# Patient Record
Sex: MALE | Race: WHITE | Employment: FULL TIME | URBAN - METROPOLITAN AREA
[De-identification: names, ages, dates, MRNs, and addresses within clinical notes are randomized per-mention and may not be internally consistent; named-entity substitution may affect disease eponyms.]

---

## 2021-01-18 ENCOUNTER — CLINICAL SUPPORT (OUTPATIENT)
Dept: URGENT CARE | Facility: CLINIC | Age: 61
End: 2021-01-18
Payer: COMMERCIAL

## 2021-01-18 DIAGNOSIS — Z20.822 EXPOSURE TO COVID-19 VIRUS: Primary | ICD-10-CM

## 2021-01-18 LAB
CTP QC/QA: YES
SARS-COV-2 RDRP RESP QL NAA+PROBE: NEGATIVE

## 2021-01-18 PROCEDURE — U0002 COVID-19 LAB TEST NON-CDC: HCPCS | Mod: QW,S$GLB,, | Performed by: NURSE PRACTITIONER

## 2021-01-18 PROCEDURE — U0002: ICD-10-PCS | Mod: QW,S$GLB,, | Performed by: NURSE PRACTITIONER

## 2022-09-08 ENCOUNTER — OFFICE VISIT (OUTPATIENT)
Dept: INTERNAL MEDICINE | Facility: CLINIC | Age: 62
End: 2022-09-08
Payer: COMMERCIAL

## 2022-09-08 VITALS
BODY MASS INDEX: 27.46 KG/M2 | RESPIRATION RATE: 18 BRPM | OXYGEN SATURATION: 98 % | WEIGHT: 170.88 LBS | HEART RATE: 58 BPM | SYSTOLIC BLOOD PRESSURE: 102 MMHG | TEMPERATURE: 98 F | HEIGHT: 66 IN | DIASTOLIC BLOOD PRESSURE: 72 MMHG

## 2022-09-08 DIAGNOSIS — Z95.5 S/P DRUG ELUTING CORONARY STENT PLACEMENT: ICD-10-CM

## 2022-09-08 DIAGNOSIS — Z76.89 ENCOUNTER TO ESTABLISH CARE: Primary | ICD-10-CM

## 2022-09-08 DIAGNOSIS — R05.9 COUGH: ICD-10-CM

## 2022-09-08 DIAGNOSIS — Z12.12 ENCOUNTER FOR COLORECTAL CANCER SCREENING: ICD-10-CM

## 2022-09-08 DIAGNOSIS — I25.2 HISTORY OF ST ELEVATION MYOCARDIAL INFARCTION (STEMI): ICD-10-CM

## 2022-09-08 DIAGNOSIS — Z12.11 ENCOUNTER FOR COLORECTAL CANCER SCREENING: ICD-10-CM

## 2022-09-08 DIAGNOSIS — I50.22 CHRONIC SYSTOLIC CONGESTIVE HEART FAILURE: ICD-10-CM

## 2022-09-08 DIAGNOSIS — E78.5 HYPERLIPIDEMIA, UNSPECIFIED HYPERLIPIDEMIA TYPE: ICD-10-CM

## 2022-09-08 DIAGNOSIS — I25.119 CORONARY ARTERY DISEASE INVOLVING NATIVE CORONARY ARTERY OF NATIVE HEART WITH ANGINA PECTORIS: ICD-10-CM

## 2022-09-08 DIAGNOSIS — Z12.5 ENCOUNTER FOR PROSTATE CANCER SCREENING: ICD-10-CM

## 2022-09-08 PROBLEM — I50.9 CONGESTIVE HEART FAILURE: Status: ACTIVE | Noted: 2022-05-10

## 2022-09-08 PROBLEM — I25.10 CORONARY ARTERY DISEASE INVOLVING NATIVE CORONARY ARTERY OF NATIVE HEART: Status: ACTIVE | Noted: 2022-05-10

## 2022-09-08 PROBLEM — I21.3 STEMI (ST ELEVATION MYOCARDIAL INFARCTION): Status: ACTIVE | Noted: 2022-05-03

## 2022-09-08 PROCEDURE — 99999 PR PBB SHADOW E&M-EST. PATIENT-LVL V: CPT | Mod: PBBFAC,,, | Performed by: NURSE PRACTITIONER

## 2022-09-08 PROCEDURE — 3074F SYST BP LT 130 MM HG: CPT | Mod: CPTII,S$GLB,, | Performed by: NURSE PRACTITIONER

## 2022-09-08 PROCEDURE — 1160F PR REVIEW ALL MEDS BY PRESCRIBER/CLIN PHARMACIST DOCUMENTED: ICD-10-PCS | Mod: CPTII,S$GLB,, | Performed by: NURSE PRACTITIONER

## 2022-09-08 PROCEDURE — 99999 PR PBB SHADOW E&M-EST. PATIENT-LVL V: ICD-10-PCS | Mod: PBBFAC,,, | Performed by: NURSE PRACTITIONER

## 2022-09-08 PROCEDURE — 3074F PR MOST RECENT SYSTOLIC BLOOD PRESSURE < 130 MM HG: ICD-10-PCS | Mod: CPTII,S$GLB,, | Performed by: NURSE PRACTITIONER

## 2022-09-08 PROCEDURE — 3008F BODY MASS INDEX DOCD: CPT | Mod: CPTII,S$GLB,, | Performed by: NURSE PRACTITIONER

## 2022-09-08 PROCEDURE — 3008F PR BODY MASS INDEX (BMI) DOCUMENTED: ICD-10-PCS | Mod: CPTII,S$GLB,, | Performed by: NURSE PRACTITIONER

## 2022-09-08 PROCEDURE — 99203 OFFICE O/P NEW LOW 30 MIN: CPT | Mod: S$GLB,,, | Performed by: NURSE PRACTITIONER

## 2022-09-08 PROCEDURE — 1159F MED LIST DOCD IN RCRD: CPT | Mod: CPTII,S$GLB,, | Performed by: NURSE PRACTITIONER

## 2022-09-08 PROCEDURE — 1160F RVW MEDS BY RX/DR IN RCRD: CPT | Mod: CPTII,S$GLB,, | Performed by: NURSE PRACTITIONER

## 2022-09-08 PROCEDURE — 1159F PR MEDICATION LIST DOCUMENTED IN MEDICAL RECORD: ICD-10-PCS | Mod: CPTII,S$GLB,, | Performed by: NURSE PRACTITIONER

## 2022-09-08 PROCEDURE — 99203 PR OFFICE/OUTPT VISIT, NEW, LEVL III, 30-44 MIN: ICD-10-PCS | Mod: S$GLB,,, | Performed by: NURSE PRACTITIONER

## 2022-09-08 PROCEDURE — 3078F PR MOST RECENT DIASTOLIC BLOOD PRESSURE < 80 MM HG: ICD-10-PCS | Mod: CPTII,S$GLB,, | Performed by: NURSE PRACTITIONER

## 2022-09-08 PROCEDURE — 3078F DIAST BP <80 MM HG: CPT | Mod: CPTII,S$GLB,, | Performed by: NURSE PRACTITIONER

## 2022-09-08 PROCEDURE — 4010F PR ACE/ARB THEARPY RXD/TAKEN: ICD-10-PCS | Mod: CPTII,S$GLB,, | Performed by: NURSE PRACTITIONER

## 2022-09-08 PROCEDURE — 4010F ACE/ARB THERAPY RXD/TAKEN: CPT | Mod: CPTII,S$GLB,, | Performed by: NURSE PRACTITIONER

## 2022-09-08 RX ORDER — LEVOCETIRIZINE DIHYDROCHLORIDE 5 MG/1
5 TABLET, FILM COATED ORAL NIGHTLY
Qty: 30 TABLET | Refills: 11 | Status: SHIPPED | OUTPATIENT
Start: 2022-09-08 | End: 2023-09-08

## 2022-09-08 RX ORDER — DAPAGLIFLOZIN 10 MG/1
10 TABLET, FILM COATED ORAL DAILY
Qty: 90 TABLET | Refills: 3 | Status: SHIPPED | OUTPATIENT
Start: 2022-09-08

## 2022-09-08 RX ORDER — NITROGLYCERIN 0.4 MG/1
0.4 TABLET SUBLINGUAL
COMMUNITY
Start: 2022-05-07 | End: 2023-05-07

## 2022-09-08 RX ORDER — TICAGRELOR 90 MG/1
90 TABLET ORAL 2 TIMES DAILY
COMMUNITY
Start: 2022-08-30 | End: 2022-09-08 | Stop reason: SDUPTHER

## 2022-09-08 RX ORDER — METOPROLOL SUCCINATE 25 MG/1
25 TABLET, EXTENDED RELEASE ORAL
COMMUNITY
Start: 2022-05-07 | End: 2022-09-08 | Stop reason: SDUPTHER

## 2022-09-08 RX ORDER — METOPROLOL SUCCINATE 25 MG/1
25 TABLET, EXTENDED RELEASE ORAL DAILY
Qty: 90 TABLET | Refills: 3 | Status: SHIPPED | OUTPATIENT
Start: 2022-09-08 | End: 2023-06-06 | Stop reason: SDUPTHER

## 2022-09-08 RX ORDER — DAPAGLIFLOZIN 10 MG/1
10 TABLET, FILM COATED ORAL
COMMUNITY
Start: 2022-05-13 | End: 2022-09-08 | Stop reason: SDUPTHER

## 2022-09-08 RX ORDER — SACUBITRIL AND VALSARTAN 24; 26 MG/1; MG/1
1 TABLET, FILM COATED ORAL 2 TIMES DAILY
COMMUNITY
Start: 2022-08-30 | End: 2022-09-08 | Stop reason: SDUPTHER

## 2022-09-08 RX ORDER — OMEPRAZOLE 20 MG/1
TABLET, DELAYED RELEASE ORAL
COMMUNITY
Start: 2022-09-02 | End: 2023-06-05

## 2022-09-08 RX ORDER — LISINOPRIL 2.5 MG/1
2.5 TABLET ORAL DAILY
COMMUNITY
Start: 2022-05-07 | End: 2022-09-08

## 2022-09-08 RX ORDER — TICAGRELOR 90 MG/1
90 TABLET ORAL 2 TIMES DAILY
Qty: 60 TABLET | Refills: 6 | Status: SHIPPED | OUTPATIENT
Start: 2022-09-08

## 2022-09-08 RX ORDER — SACUBITRIL AND VALSARTAN 24; 26 MG/1; MG/1
1 TABLET, FILM COATED ORAL 2 TIMES DAILY
Qty: 60 TABLET | Refills: 6 | Status: SHIPPED | OUTPATIENT
Start: 2022-09-08

## 2022-09-08 RX ORDER — ATORVASTATIN CALCIUM 40 MG/1
40 TABLET, FILM COATED ORAL NIGHTLY
COMMUNITY
Start: 2022-08-02 | End: 2022-09-08 | Stop reason: SDUPTHER

## 2022-09-08 RX ORDER — NAPROXEN SODIUM 220 MG/1
81 TABLET, FILM COATED ORAL DAILY
COMMUNITY
Start: 2022-08-02

## 2022-09-08 RX ORDER — ATORVASTATIN CALCIUM 40 MG/1
40 TABLET, FILM COATED ORAL NIGHTLY
Qty: 90 TABLET | Refills: 3 | Status: SHIPPED | OUTPATIENT
Start: 2022-09-08 | End: 2023-11-20 | Stop reason: SDUPTHER

## 2022-09-08 NOTE — PROGRESS NOTES
Subjective:       Patient ID: Gordon Dewitt is a 62 y.o. male.    Chief Complaint: Annual Exam (Physical; labs done)    Gordon Dewitt is a 62 y.o. male who presents today to establish care.     Patient and his wife note a cough that initially was occurring only with eating but in the recent months has become more frequent. He cannot correlate it to particular foods or certain times of day. Notes it is worse if he is sitting in the car with the air blowing in his face.   Denies difficulty swallowing. Currently taking Prilosec to rule out GERD as cause.     Past Medical History:   Diagnosis Date    CAD (coronary artery disease)     CHF (congestive heart failure), NYHA class I     Hyperlipidemia      History reviewed. No pertinent surgical history.  Social History     Socioeconomic History    Marital status:    Tobacco Use    Smoking status: Never    Smokeless tobacco: Never   Substance and Sexual Activity    Alcohol use: Never    Drug use: Never    Sexual activity: Yes     Partners: Female     Birth control/protection: None     Comment: Partner post menopausal     Family History   Problem Relation Age of Onset    Diabetes Father     Diabetes Brother     Heart disease Brother        Review of patient's allergies indicates:  No Known Allergies    Medication List with Changes/Refills   New Medications    LEVOCETIRIZINE (XYZAL) 5 MG TABLET    Take 1 tablet (5 mg total) by mouth every evening.   Current Medications    ASPIRIN 81 MG CHEW    Take 81 mg by mouth once daily.    NITROGLYCERIN (NITROSTAT) 0.4 MG SL TABLET    Place 0.4 mg under the tongue.    OMEPRAZOLE (PRILOSEC OTC) 20 MG TABLET       Changed and/or Refilled Medications    Modified Medication Previous Medication    ATORVASTATIN (LIPITOR) 40 MG TABLET atorvastatin (LIPITOR) 40 MG tablet       Take 1 tablet (40 mg total) by mouth every evening.    Take 40 mg by mouth every evening.    BRILINTA 90 MG TABLET BRILINTA 90 mg tablet       Take 1 tablet (90  "mg total) by mouth 2 (two) times daily.    Take 90 mg by mouth 2 (two) times daily.    DAPAGLIFLOZIN (FARXIGA) 10 MG TABLET dapagliflozin (FARXIGA) 10 mg tablet       Take 1 tablet (10 mg total) by mouth once daily at 6am.    Take 10 mg by mouth.    ENTRESTO 24-26 MG PER TABLET ENTRESTO 24-26 mg per tablet       Take 1 tablet by mouth 2 (two) times daily.    Take 1 tablet by mouth 2 (two) times daily.    METOPROLOL SUCCINATE (TOPROL-XL) 25 MG 24 HR TABLET metoprolol succinate (TOPROL-XL) 25 MG 24 hr tablet       Take 1 tablet (25 mg total) by mouth once daily.    Take 25 mg by mouth.   Discontinued Medications    LISINOPRIL (PRINIVIL,ZESTRIL) 2.5 MG TABLET    Take 2.5 mg by mouth once daily.       Review of Systems   Constitutional:  Negative for chills and fever.   Respiratory:  Positive for cough. Negative for shortness of breath.    Cardiovascular:  Negative for chest pain.   Gastrointestinal:  Negative for heartburn.   Neurological:  Negative for dizziness and headaches.     Objective:   /72 (BP Location: Right arm, Patient Position: Sitting, BP Method: Medium (Manual))   Pulse (!) 58   Temp 98.2 °F (36.8 °C) (Temporal)   Resp 18   Ht 5' 6" (1.676 m)   Wt 77.5 kg (170 lb 13.7 oz)   SpO2 98%   BMI 27.58 kg/m²     Physical Exam  Vitals reviewed.   Constitutional:       Appearance: Normal appearance.   HENT:      Head: Normocephalic and atraumatic.   Cardiovascular:      Rate and Rhythm: Normal rate and regular rhythm.      Heart sounds: Normal heart sounds. No murmur heard.  Pulmonary:      Effort: Pulmonary effort is normal.      Breath sounds: Normal breath sounds. No wheezing.   Skin:     General: Skin is warm and dry.   Neurological:      Mental Status: He is alert and oriented to person, place, and time.     Assessment and Plan:       1. Encounter to establish care    2. Cough    - levocetirizine (XYZAL) 5 MG tablet; Take 1 tablet (5 mg total) by mouth every evening.  Dispense: 30 tablet; Refill: " 11    3. Chronic systolic congestive heart failure  4. Coronary artery disease involving native coronary artery of native heart with angina pectoris    - ENTRESTO 24-26 mg per tablet; Take 1 tablet by mouth 2 (two) times daily.  Dispense: 60 tablet; Refill: 6  - dapagliflozin (FARXIGA) 10 mg tablet; Take 1 tablet (10 mg total) by mouth once daily at 6am.  Dispense: 90 tablet; Refill: 3  - metoprolol succinate (TOPROL-XL) 25 MG 24 hr tablet; Take 1 tablet (25 mg total) by mouth once daily.  Dispense: 90 tablet; Refill: 3  5. Hyperlipidemia, unspecified hyperlipidemia type    - atorvastatin (LIPITOR) 40 MG tablet; Take 1 tablet (40 mg total) by mouth every evening.  Dispense: 90 tablet; Refill: 3  - Lipid Panel; Future    6. S/P drug eluting coronary stent placement  7. History of ST elevation myocardial infarction (STEMI)    - BRILINTA 90 mg tablet; Take 1 tablet (90 mg total) by mouth 2 (two) times daily.  Dispense: 60 tablet; Refill: 6  - CBC Auto Differential; Future  - Comprehensive Metabolic Panel; Future  - TSH; Future  - PSA, Screening; Future  - Hemoglobin A1C; Future    8. Encounter for prostate cancer screening    - PSA, Screening; Future    9. Encounter for colorectal cancer screening    - Ambulatory referral/consult to Endo Procedure ; Future

## 2022-09-27 ENCOUNTER — PATIENT MESSAGE (OUTPATIENT)
Dept: INTERNAL MEDICINE | Facility: CLINIC | Age: 62
End: 2022-09-27
Payer: COMMERCIAL

## 2023-01-18 ENCOUNTER — LAB VISIT (OUTPATIENT)
Dept: LAB | Facility: HOSPITAL | Age: 63
End: 2023-01-18
Payer: COMMERCIAL

## 2023-01-18 DIAGNOSIS — Z12.5 ENCOUNTER FOR PROSTATE CANCER SCREENING: ICD-10-CM

## 2023-01-18 DIAGNOSIS — E78.5 HYPERLIPIDEMIA, UNSPECIFIED HYPERLIPIDEMIA TYPE: ICD-10-CM

## 2023-01-18 DIAGNOSIS — I50.22 CHRONIC SYSTOLIC CONGESTIVE HEART FAILURE: ICD-10-CM

## 2023-01-18 DIAGNOSIS — I25.2 HISTORY OF ST ELEVATION MYOCARDIAL INFARCTION (STEMI): ICD-10-CM

## 2023-01-18 DIAGNOSIS — I25.119 CORONARY ARTERY DISEASE INVOLVING NATIVE CORONARY ARTERY OF NATIVE HEART WITH ANGINA PECTORIS: ICD-10-CM

## 2023-01-18 LAB
ALBUMIN SERPL BCP-MCNC: 4.2 G/DL (ref 3.5–5.2)
ALP SERPL-CCNC: 60 U/L (ref 55–135)
ALT SERPL W/O P-5'-P-CCNC: 20 U/L (ref 10–44)
ANION GAP SERPL CALC-SCNC: 8 MMOL/L (ref 8–16)
AST SERPL-CCNC: 21 U/L (ref 10–40)
BASOPHILS # BLD AUTO: 0.07 K/UL (ref 0–0.2)
BASOPHILS NFR BLD: 1.2 % (ref 0–1.9)
BILIRUB SERPL-MCNC: 0.5 MG/DL (ref 0.1–1)
BUN SERPL-MCNC: 10 MG/DL (ref 8–23)
CALCIUM SERPL-MCNC: 9.3 MG/DL (ref 8.7–10.5)
CHLORIDE SERPL-SCNC: 108 MMOL/L (ref 95–110)
CHOLEST SERPL-MCNC: 115 MG/DL (ref 120–199)
CHOLEST/HDLC SERPL: 3.1 {RATIO} (ref 2–5)
CO2 SERPL-SCNC: 23 MMOL/L (ref 23–29)
COMPLEXED PSA SERPL-MCNC: 1 NG/ML (ref 0–4)
CREAT SERPL-MCNC: 1 MG/DL (ref 0.5–1.4)
DIFFERENTIAL METHOD: ABNORMAL
EOSINOPHIL # BLD AUTO: 0.3 K/UL (ref 0–0.5)
EOSINOPHIL NFR BLD: 4.3 % (ref 0–8)
ERYTHROCYTE [DISTWIDTH] IN BLOOD BY AUTOMATED COUNT: 15 % (ref 11.5–14.5)
EST. GFR  (NO RACE VARIABLE): >60 ML/MIN/1.73 M^2
ESTIMATED AVG GLUCOSE: 128 MG/DL (ref 68–131)
GLUCOSE SERPL-MCNC: 99 MG/DL (ref 70–110)
HBA1C MFR BLD: 6.1 % (ref 4–5.6)
HCT VFR BLD AUTO: 46.6 % (ref 40–54)
HDLC SERPL-MCNC: 37 MG/DL (ref 40–75)
HDLC SERPL: 32.2 % (ref 20–50)
HGB BLD-MCNC: 14.1 G/DL (ref 14–18)
IMM GRANULOCYTES # BLD AUTO: 0.02 K/UL (ref 0–0.04)
IMM GRANULOCYTES NFR BLD AUTO: 0.3 % (ref 0–0.5)
LDLC SERPL CALC-MCNC: 62 MG/DL (ref 63–159)
LYMPHOCYTES # BLD AUTO: 2.5 K/UL (ref 1–4.8)
LYMPHOCYTES NFR BLD: 41.6 % (ref 18–48)
MCH RBC QN AUTO: 26.1 PG (ref 27–31)
MCHC RBC AUTO-ENTMCNC: 30.3 G/DL (ref 32–36)
MCV RBC AUTO: 86 FL (ref 82–98)
MONOCYTES # BLD AUTO: 0.5 K/UL (ref 0.3–1)
MONOCYTES NFR BLD: 8.8 % (ref 4–15)
NEUTROPHILS # BLD AUTO: 2.6 K/UL (ref 1.8–7.7)
NEUTROPHILS NFR BLD: 43.8 % (ref 38–73)
NONHDLC SERPL-MCNC: 78 MG/DL
NRBC BLD-RTO: 0 /100 WBC
PLATELET # BLD AUTO: 252 K/UL (ref 150–450)
PMV BLD AUTO: 11.6 FL (ref 9.2–12.9)
POTASSIUM SERPL-SCNC: 4.2 MMOL/L (ref 3.5–5.1)
PROT SERPL-MCNC: 7.3 G/DL (ref 6–8.4)
RBC # BLD AUTO: 5.41 M/UL (ref 4.6–6.2)
SODIUM SERPL-SCNC: 139 MMOL/L (ref 136–145)
TRIGL SERPL-MCNC: 80 MG/DL (ref 30–150)
TSH SERPL DL<=0.005 MIU/L-ACNC: 1.76 UIU/ML (ref 0.4–4)
WBC # BLD AUTO: 6.03 K/UL (ref 3.9–12.7)

## 2023-01-18 PROCEDURE — 84153 ASSAY OF PSA TOTAL: CPT | Performed by: NURSE PRACTITIONER

## 2023-01-18 PROCEDURE — 80053 COMPREHEN METABOLIC PANEL: CPT | Performed by: NURSE PRACTITIONER

## 2023-01-18 PROCEDURE — 36415 COLL VENOUS BLD VENIPUNCTURE: CPT | Mod: PO | Performed by: NURSE PRACTITIONER

## 2023-01-18 PROCEDURE — 83036 HEMOGLOBIN GLYCOSYLATED A1C: CPT | Performed by: NURSE PRACTITIONER

## 2023-01-18 PROCEDURE — 85025 COMPLETE CBC W/AUTO DIFF WBC: CPT | Performed by: NURSE PRACTITIONER

## 2023-01-18 PROCEDURE — 84443 ASSAY THYROID STIM HORMONE: CPT | Performed by: NURSE PRACTITIONER

## 2023-01-18 PROCEDURE — 80061 LIPID PANEL: CPT | Performed by: NURSE PRACTITIONER

## 2023-01-31 ENCOUNTER — PATIENT MESSAGE (OUTPATIENT)
Dept: INTERNAL MEDICINE | Facility: CLINIC | Age: 63
End: 2023-01-31
Payer: COMMERCIAL

## 2023-01-31 DIAGNOSIS — R05.3 CHRONIC COUGH: Primary | ICD-10-CM

## 2023-02-07 ENCOUNTER — HOSPITAL ENCOUNTER (OUTPATIENT)
Dept: PULMONOLOGY | Facility: CLINIC | Age: 63
Discharge: HOME | End: 2023-02-07
Payer: COMMERCIAL

## 2023-02-07 DIAGNOSIS — R05.3 CHRONIC COUGH: ICD-10-CM

## 2023-02-07 PROCEDURE — 94729 PR C02/MEMBANE DIFFUSE CAPACITY: ICD-10-PCS | Mod: S$GLB,,, | Performed by: INTERNAL MEDICINE

## 2023-02-07 PROCEDURE — 94060 PR EVAL OF BRONCHOSPASM: ICD-10-PCS | Mod: S$GLB,,, | Performed by: INTERNAL MEDICINE

## 2023-02-07 PROCEDURE — 94727 GAS DIL/WSHOT DETER LNG VOL: CPT | Mod: S$GLB,,, | Performed by: INTERNAL MEDICINE

## 2023-02-07 PROCEDURE — 94729 DIFFUSING CAPACITY: CPT | Mod: S$GLB,,, | Performed by: INTERNAL MEDICINE

## 2023-02-07 PROCEDURE — 94060 EVALUATION OF WHEEZING: CPT | Mod: S$GLB,,, | Performed by: INTERNAL MEDICINE

## 2023-02-07 PROCEDURE — 94727 PR PULM FUNCTION TEST BY GAS: ICD-10-PCS | Mod: S$GLB,,, | Performed by: INTERNAL MEDICINE

## 2023-02-14 ENCOUNTER — PATIENT MESSAGE (OUTPATIENT)
Dept: INTERNAL MEDICINE | Facility: CLINIC | Age: 63
End: 2023-02-14
Payer: COMMERCIAL

## 2023-02-14 DIAGNOSIS — R05.3 CHRONIC COUGH: Primary | ICD-10-CM

## 2023-02-14 NOTE — TELEPHONE ENCOUNTER
Pt states the pulmonary function test was normal , pt was prescribed a albuterol inhaler as it was recommended by the cardiologist. The cough is somewhat better. Pt is asking do you think he should have a chest CT ? The cough started 9 months ago.

## 2023-02-18 ENCOUNTER — PATIENT MESSAGE (OUTPATIENT)
Dept: INTERNAL MEDICINE | Facility: CLINIC | Age: 63
End: 2023-02-18
Payer: COMMERCIAL

## 2023-03-29 ENCOUNTER — PATIENT MESSAGE (OUTPATIENT)
Dept: INTERNAL MEDICINE | Facility: CLINIC | Age: 63
End: 2023-03-29
Payer: COMMERCIAL

## 2023-03-29 NOTE — TELEPHONE ENCOUNTER
Left message to call back.   See [employer] message for symptom updates--past 3 weeks cough has been worst, vomiting and SOB--pt went to  emergency for evaluation.     Pt given Pulmonology dept #[phone] for scheduling.

## 2023-03-31 ENCOUNTER — OFFICE VISIT (OUTPATIENT)
Dept: PULMONOLOGY | Facility: CLINIC | Age: 63
End: 2023-03-31
Payer: COMMERCIAL

## 2023-03-31 VITALS
SYSTOLIC BLOOD PRESSURE: 110 MMHG | BODY MASS INDEX: 25.79 KG/M2 | HEIGHT: 66 IN | WEIGHT: 160.5 LBS | DIASTOLIC BLOOD PRESSURE: 70 MMHG | HEART RATE: 61 BPM | OXYGEN SATURATION: 97 %

## 2023-03-31 DIAGNOSIS — R05.3 CHRONIC COUGH: ICD-10-CM

## 2023-03-31 DIAGNOSIS — J30.2 SEASONAL ALLERGIC RHINITIS, UNSPECIFIED TRIGGER: Primary | ICD-10-CM

## 2023-03-31 PROCEDURE — 3044F PR MOST RECENT HEMOGLOBIN A1C LEVEL <7.0%: ICD-10-PCS | Mod: CPTII,S$GLB,, | Performed by: INTERNAL MEDICINE

## 2023-03-31 PROCEDURE — 3008F BODY MASS INDEX DOCD: CPT | Mod: CPTII,S$GLB,, | Performed by: INTERNAL MEDICINE

## 2023-03-31 PROCEDURE — 3044F HG A1C LEVEL LT 7.0%: CPT | Mod: CPTII,S$GLB,, | Performed by: INTERNAL MEDICINE

## 2023-03-31 PROCEDURE — 99204 PR OFFICE/OUTPT VISIT, NEW, LEVL IV, 45-59 MIN: ICD-10-PCS | Mod: S$GLB,,, | Performed by: INTERNAL MEDICINE

## 2023-03-31 PROCEDURE — 3008F PR BODY MASS INDEX (BMI) DOCUMENTED: ICD-10-PCS | Mod: CPTII,S$GLB,, | Performed by: INTERNAL MEDICINE

## 2023-03-31 PROCEDURE — 99999 PR PBB SHADOW E&M-EST. PATIENT-LVL IV: ICD-10-PCS | Mod: PBBFAC,,, | Performed by: INTERNAL MEDICINE

## 2023-03-31 PROCEDURE — 3074F PR MOST RECENT SYSTOLIC BLOOD PRESSURE < 130 MM HG: ICD-10-PCS | Mod: CPTII,S$GLB,, | Performed by: INTERNAL MEDICINE

## 2023-03-31 PROCEDURE — 99204 OFFICE O/P NEW MOD 45 MIN: CPT | Mod: S$GLB,,, | Performed by: INTERNAL MEDICINE

## 2023-03-31 PROCEDURE — 3078F PR MOST RECENT DIASTOLIC BLOOD PRESSURE < 80 MM HG: ICD-10-PCS | Mod: CPTII,S$GLB,, | Performed by: INTERNAL MEDICINE

## 2023-03-31 PROCEDURE — 1159F PR MEDICATION LIST DOCUMENTED IN MEDICAL RECORD: ICD-10-PCS | Mod: CPTII,S$GLB,, | Performed by: INTERNAL MEDICINE

## 2023-03-31 PROCEDURE — 1160F RVW MEDS BY RX/DR IN RCRD: CPT | Mod: CPTII,S$GLB,, | Performed by: INTERNAL MEDICINE

## 2023-03-31 PROCEDURE — 1159F MED LIST DOCD IN RCRD: CPT | Mod: CPTII,S$GLB,, | Performed by: INTERNAL MEDICINE

## 2023-03-31 PROCEDURE — 1160F PR REVIEW ALL MEDS BY PRESCRIBER/CLIN PHARMACIST DOCUMENTED: ICD-10-PCS | Mod: CPTII,S$GLB,, | Performed by: INTERNAL MEDICINE

## 2023-03-31 PROCEDURE — 4010F ACE/ARB THERAPY RXD/TAKEN: CPT | Mod: CPTII,S$GLB,, | Performed by: INTERNAL MEDICINE

## 2023-03-31 PROCEDURE — 3074F SYST BP LT 130 MM HG: CPT | Mod: CPTII,S$GLB,, | Performed by: INTERNAL MEDICINE

## 2023-03-31 PROCEDURE — 4010F PR ACE/ARB THEARPY RXD/TAKEN: ICD-10-PCS | Mod: CPTII,S$GLB,, | Performed by: INTERNAL MEDICINE

## 2023-03-31 PROCEDURE — 99999 PR PBB SHADOW E&M-EST. PATIENT-LVL IV: CPT | Mod: PBBFAC,,, | Performed by: INTERNAL MEDICINE

## 2023-03-31 PROCEDURE — 3078F DIAST BP <80 MM HG: CPT | Mod: CPTII,S$GLB,, | Performed by: INTERNAL MEDICINE

## 2023-03-31 RX ORDER — MONTELUKAST SODIUM 10 MG/1
10 TABLET ORAL DAILY
COMMUNITY

## 2023-03-31 RX ORDER — FLUTICASONE PROPIONATE AND SALMETEROL 250; 50 UG/1; UG/1
1 POWDER RESPIRATORY (INHALATION) 2 TIMES DAILY
Qty: 1 EACH | Refills: 11 | Status: SHIPPED | OUTPATIENT
Start: 2023-03-31 | End: 2024-03-30

## 2023-03-31 RX ORDER — CLOTRIMAZOLE AND BETAMETHASONE DIPROPIONATE 10; .64 MG/G; MG/G
CREAM TOPICAL
COMMUNITY
Start: 2023-01-27 | End: 2023-06-05

## 2023-03-31 RX ORDER — ALBUTEROL SULFATE 90 UG/1
2 AEROSOL, METERED RESPIRATORY (INHALATION) EVERY 4 HOURS PRN
COMMUNITY
Start: 2023-03-23

## 2023-03-31 RX ORDER — BUDESONIDE 180 UG/1
2 AEROSOL, POWDER RESPIRATORY (INHALATION) 2 TIMES DAILY
COMMUNITY
Start: 2023-02-03 | End: 2023-03-31

## 2023-03-31 RX ORDER — BENZONATATE 100 MG/1
100 CAPSULE ORAL
COMMUNITY
Start: 2023-03-23 | End: 2023-06-05

## 2023-03-31 RX ORDER — PREDNISONE 20 MG/1
20 TABLET ORAL 2 TIMES DAILY
COMMUNITY
Start: 2023-03-23 | End: 2023-06-06 | Stop reason: SDUPTHER

## 2023-03-31 RX ORDER — INHALER, ASSIST DEVICES
SPACER (EA) MISCELLANEOUS
COMMUNITY
Start: 2023-02-02 | End: 2023-03-31

## 2023-03-31 RX ORDER — FLUTICASONE PROPIONATE 50 MCG
1 SPRAY, SUSPENSION (ML) NASAL DAILY
Qty: 16 G | Refills: 2 | Status: SHIPPED | OUTPATIENT
Start: 2023-03-31 | End: 2023-06-06 | Stop reason: SDUPTHER

## 2023-03-31 RX ORDER — AZELASTINE 1 MG/ML
1 SPRAY, METERED NASAL 2 TIMES DAILY
Qty: 120 ML | Refills: 0 | Status: SHIPPED | OUTPATIENT
Start: 2023-03-31 | End: 2023-06-06 | Stop reason: SDUPTHER

## 2023-03-31 NOTE — PROGRESS NOTES
Subjective:       Patient ID: Gordon Dewitt is a 63 y.o. male.    Chief Complaint: Cough    HPI:   Gordon Dewitt is a 63 y.o. male who presents with chronic cough.    Reports cough for months.   Cough, initially dry but now with mucous.   ED last week and was treated with doxycyline and steroids.   He has not noticed much improvement after treatment.    Prescribed inhalers, but doesn't take them.   The dry powdered inhaler made him feel like his throat was closing.    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        Review of Systems   HENT:  Negative for postnasal drip and congestion.    Respiratory:  Positive for cough, sputum production, wheezing and asthma nighttime symptoms.    Cardiovascular:         Sometimes has issues swallowing.   Skin:  Negative for rash.       Social History     Tobacco Use    Smoking status: Never    Smokeless tobacco: Never   Substance Use Topics    Alcohol use: Never       Review of patient's allergies indicates:  No Known Allergies  Past Medical History:   Diagnosis Date    CAD (coronary artery disease)     CHF (congestive heart failure), NYHA class I     Hyperlipidemia      No past surgical history on file.  Current Outpatient Medications on File Prior to Visit   Medication Sig    aspirin 81 MG Chew Take 81 mg by mouth once daily.    atorvastatin (LIPITOR) 40 MG tablet Take 1 tablet (40 mg total) by mouth every evening.    BRILINTA 90 mg tablet Take 1 tablet (90 mg total) by mouth 2 (two) times daily.    dapagliflozin (FARXIGA) 10 mg tablet Take 1 tablet (10 mg total) by mouth once daily at 6am.    ENTRESTO 24-26 mg per tablet Take 1 tablet by mouth 2 (two) times daily.    levocetirizine (XYZAL) 5 MG tablet Take 1 tablet (5 mg total) by mouth every evening.    metoprolol succinate (TOPROL-XL) 25 MG 24 hr tablet Take 1 tablet (25 mg total) by mouth once daily.    nitroGLYCERIN (NITROSTAT) 0.4 MG SL tablet Place 0.4 mg  "under the tongue.    omeprazole (PRILOSEC OTC) 20 MG tablet      No current facility-administered medications on file prior to visit.       Objective:      Vitals:    03/31/23 0915   BP: 110/70   BP Location: Left arm   Patient Position: Sitting   Pulse: 61   SpO2: 97%   Weight: 72.8 kg (160 lb 7.9 oz)   Height: 5' 6" (1.676 m)     Physical Exam   Constitutional: He is oriented to person, place, and time.   HENT:   Mouth/Throat: Mallampati Score: I.   Cardiovascular: Normal rate and regular rhythm.   Pulmonary/Chest: Normal expansion. He has no wheezes. He has no rhonchi. He has no rales.   Neurological: He is alert and oriented to person, place, and time.   Skin: Skin is warm and dry.   Personal Diagnostic Review    No flowsheet data found.      No image results found.    PFTs: 2/7/23: no obstruction, no restriction, normal DLCO  CXR: EJ: 3/2023: NAPD  EOX 1/2023: 300    Assessment:     Orders Placed This Encounter   Procedures    CBC auto differential     Standing Status:   Future     Standing Expiration Date:   5/29/2024    IGE     Standing Status:   Future     Standing Expiration Date:   5/29/2024     1. Chronic cough        Plan:         Problem List Items Addressed This Visit          ENT    Seasonal allergic rhinitis - Primary    Current Assessment & Plan     Start flonase daily and astelin bid.              Pulmonary    Chronic cough    Current Assessment & Plan     Etiology unclear.   Chest xray is unremarkable.  PFTs show no obstruction and no BDR.   Perhaps NAEB.    Start advair bid. Patient instructed to rinse/gargle after each use.  Continue omeprazole.   Will also treat UACS in case this is multi-factorial   Check CBC, IgE         Relevant Orders    CBC auto differential    IGE        Critical Care Time: 40 minutes  Critical care was time spent personally by me on the following activities: evaluating this patient's organ dysfunction, development of treatment plan, discussing treatment plan with patient " or surrogate and bedside caregivers, discussions with consultants, evaluation of patient's response to treatment, examination of patient, ordering and performing treatments and interventions, ordering and review of laboratory studies, ordering and review of radiographic studies, re-evaluation of patient's condition. This critical care time did not overlap with that of any other provider or involve time for any procedures.

## 2023-03-31 NOTE — ASSESSMENT & PLAN NOTE
Etiology unclear.   Chest xray is unremarkable.  PFTs show no obstruction and no BDR.   Perhaps NAEB.    · Start advair bid. Patient instructed to rinse/gargle after each use.  · Continue omeprazole.   · Will also treat UACS in case this is multi-factorial   · Check CBC, IgE

## 2023-03-31 NOTE — PATIENT INSTRUCTIONS
Start Advair 2 puffs twice daily, every day. Patient instructed to rinse/gargle after each use. Stop the pulmonary flexihaler.   Continue xyzal and montelukast.   Continue omeprazole daily  Start flonase 1 spray in each nostril daily and asteline 1 spray in each nostril twice daily

## 2023-04-14 ENCOUNTER — LAB VISIT (OUTPATIENT)
Dept: LAB | Facility: HOSPITAL | Age: 63
End: 2023-04-14
Attending: INTERNAL MEDICINE
Payer: COMMERCIAL

## 2023-04-14 DIAGNOSIS — R05.3 CHRONIC COUGH: ICD-10-CM

## 2023-04-14 LAB
BASOPHILS # BLD AUTO: 0.04 K/UL (ref 0–0.2)
BASOPHILS NFR BLD: 0.7 % (ref 0–1.9)
DIFFERENTIAL METHOD: ABNORMAL
EOSINOPHIL # BLD AUTO: 0.2 K/UL (ref 0–0.5)
EOSINOPHIL NFR BLD: 4 % (ref 0–8)
ERYTHROCYTE [DISTWIDTH] IN BLOOD BY AUTOMATED COUNT: 14.6 % (ref 11.5–14.5)
HCT VFR BLD AUTO: 42.4 % (ref 40–54)
HGB BLD-MCNC: 13 G/DL (ref 14–18)
IGE SERPL-ACNC: <35 IU/ML (ref 0–100)
IMM GRANULOCYTES # BLD AUTO: 0.02 K/UL (ref 0–0.04)
IMM GRANULOCYTES NFR BLD AUTO: 0.3 % (ref 0–0.5)
LYMPHOCYTES # BLD AUTO: 2.2 K/UL (ref 1–4.8)
LYMPHOCYTES NFR BLD: 37.1 % (ref 18–48)
MCH RBC QN AUTO: 26.6 PG (ref 27–31)
MCHC RBC AUTO-ENTMCNC: 30.7 G/DL (ref 32–36)
MCV RBC AUTO: 87 FL (ref 82–98)
MONOCYTES # BLD AUTO: 0.5 K/UL (ref 0.3–1)
MONOCYTES NFR BLD: 8.3 % (ref 4–15)
NEUTROPHILS # BLD AUTO: 3 K/UL (ref 1.8–7.7)
NEUTROPHILS NFR BLD: 49.6 % (ref 38–73)
NRBC BLD-RTO: 0 /100 WBC
PLATELET # BLD AUTO: 241 K/UL (ref 150–450)
PMV BLD AUTO: 11.1 FL (ref 9.2–12.9)
RBC # BLD AUTO: 4.89 M/UL (ref 4.6–6.2)
WBC # BLD AUTO: 6.01 K/UL (ref 3.9–12.7)

## 2023-04-14 PROCEDURE — 82785 ASSAY OF IGE: CPT | Performed by: INTERNAL MEDICINE

## 2023-04-14 PROCEDURE — 36415 COLL VENOUS BLD VENIPUNCTURE: CPT | Mod: PO | Performed by: INTERNAL MEDICINE

## 2023-04-14 PROCEDURE — 85025 COMPLETE CBC W/AUTO DIFF WBC: CPT | Performed by: INTERNAL MEDICINE

## 2023-04-17 ENCOUNTER — OFFICE VISIT (OUTPATIENT)
Dept: INTERNAL MEDICINE | Facility: CLINIC | Age: 63
End: 2023-04-17
Payer: COMMERCIAL

## 2023-04-17 VITALS
DIASTOLIC BLOOD PRESSURE: 78 MMHG | RESPIRATION RATE: 20 BRPM | OXYGEN SATURATION: 97 % | BODY MASS INDEX: 27.85 KG/M2 | HEIGHT: 64 IN | SYSTOLIC BLOOD PRESSURE: 112 MMHG | TEMPERATURE: 98 F | HEART RATE: 65 BPM | WEIGHT: 163.13 LBS

## 2023-04-17 DIAGNOSIS — Z48.02 ENCOUNTER FOR REMOVAL OF SUTURES: Primary | ICD-10-CM

## 2023-04-17 DIAGNOSIS — Z48.02: ICD-10-CM

## 2023-04-17 PROCEDURE — 99999 PR PBB SHADOW E&M-EST. PATIENT-LVL IV: CPT | Mod: PBBFAC,,, | Performed by: NURSE PRACTITIONER

## 2023-04-17 PROCEDURE — 4010F PR ACE/ARB THEARPY RXD/TAKEN: ICD-10-PCS | Mod: CPTII,S$GLB,, | Performed by: NURSE PRACTITIONER

## 2023-04-17 PROCEDURE — 99213 PR OFFICE/OUTPT VISIT, EST, LEVL III, 20-29 MIN: ICD-10-PCS | Mod: S$GLB,,, | Performed by: NURSE PRACTITIONER

## 2023-04-17 PROCEDURE — 3074F PR MOST RECENT SYSTOLIC BLOOD PRESSURE < 130 MM HG: ICD-10-PCS | Mod: CPTII,S$GLB,, | Performed by: NURSE PRACTITIONER

## 2023-04-17 PROCEDURE — 1159F PR MEDICATION LIST DOCUMENTED IN MEDICAL RECORD: ICD-10-PCS | Mod: CPTII,S$GLB,, | Performed by: NURSE PRACTITIONER

## 2023-04-17 PROCEDURE — 99213 OFFICE O/P EST LOW 20 MIN: CPT | Mod: S$GLB,,, | Performed by: NURSE PRACTITIONER

## 2023-04-17 PROCEDURE — 3008F PR BODY MASS INDEX (BMI) DOCUMENTED: ICD-10-PCS | Mod: CPTII,S$GLB,, | Performed by: NURSE PRACTITIONER

## 2023-04-17 PROCEDURE — 99024 SUTURE REMOVAL: ICD-10-PCS | Mod: S$GLB,,, | Performed by: NURSE PRACTITIONER

## 2023-04-17 PROCEDURE — 3044F HG A1C LEVEL LT 7.0%: CPT | Mod: CPTII,S$GLB,, | Performed by: NURSE PRACTITIONER

## 2023-04-17 PROCEDURE — 1159F MED LIST DOCD IN RCRD: CPT | Mod: CPTII,S$GLB,, | Performed by: NURSE PRACTITIONER

## 2023-04-17 PROCEDURE — 99024 POSTOP FOLLOW-UP VISIT: CPT | Mod: S$GLB,,, | Performed by: NURSE PRACTITIONER

## 2023-04-17 PROCEDURE — 4010F ACE/ARB THERAPY RXD/TAKEN: CPT | Mod: CPTII,S$GLB,, | Performed by: NURSE PRACTITIONER

## 2023-04-17 PROCEDURE — 3078F DIAST BP <80 MM HG: CPT | Mod: CPTII,S$GLB,, | Performed by: NURSE PRACTITIONER

## 2023-04-17 PROCEDURE — 3044F PR MOST RECENT HEMOGLOBIN A1C LEVEL <7.0%: ICD-10-PCS | Mod: CPTII,S$GLB,, | Performed by: NURSE PRACTITIONER

## 2023-04-17 PROCEDURE — 99999 PR PBB SHADOW E&M-EST. PATIENT-LVL IV: ICD-10-PCS | Mod: PBBFAC,,, | Performed by: NURSE PRACTITIONER

## 2023-04-17 PROCEDURE — 3008F BODY MASS INDEX DOCD: CPT | Mod: CPTII,S$GLB,, | Performed by: NURSE PRACTITIONER

## 2023-04-17 PROCEDURE — 3078F PR MOST RECENT DIASTOLIC BLOOD PRESSURE < 80 MM HG: ICD-10-PCS | Mod: CPTII,S$GLB,, | Performed by: NURSE PRACTITIONER

## 2023-04-17 PROCEDURE — 3074F SYST BP LT 130 MM HG: CPT | Mod: CPTII,S$GLB,, | Performed by: NURSE PRACTITIONER

## 2023-04-17 NOTE — PROGRESS NOTES
Subjective:       Patient ID: Gordon Dewitt is a 63 y.o. male.    Chief Complaint: Suture / Staple Removal      Patient is a 63 y.o. male who traditionally follows with Primary Doctor No presenting today for suture/staple removal.     Patient suffered a closed head injury and laceration to forehead and scalp on 4/6/2023. He was treated at [facility]. He is unclear on the number of sutures and staples placed and note is unclear regarding exact number.     Review of patient's allergies indicates:  No Known Allergies    Medication List with Changes/Refills   Current Medications    ALBUTEROL (PROVENTIL/VENTOLIN HFA) 90 MCG/ACTUATION INHALER    2 puffs every 4 (four) hours as needed.    ASPIRIN 81 MG CHEW    Take 81 mg by mouth once daily.    ATORVASTATIN (LIPITOR) 40 MG TABLET    Take 1 tablet (40 mg total) by mouth every evening.    AZELASTINE (ASTELIN) 137 MCG (0.1 %) NASAL SPRAY    1 spray (137 mcg total) by Nasal route 2 (two) times daily.    BENZONATATE (TESSALON) 100 MG CAPSULE    Take 100 mg by mouth.    BRILINTA 90 MG TABLET    Take 1 tablet (90 mg total) by mouth 2 (two) times daily.    CLOTRIMAZOLE-BETAMETHASONE 1-0.05% (LOTRISONE) CREAM    Apply topically.    DAPAGLIFLOZIN (FARXIGA) 10 MG TABLET    Take 1 tablet (10 mg total) by mouth once daily at 6am.    ENTRESTO 24-26 MG PER TABLET    Take 1 tablet by mouth 2 (two) times daily.    FLUTICASONE PROPIONATE (FLONASE) 50 MCG/ACTUATION NASAL SPRAY    1 spray (50 mcg total) by Each Nostril route once daily.    FLUTICASONE-SALMETEROL DISKUS INHALER 250-50 MCG    Inhale 1 puff into the lungs 2 (two) times daily. Controller    LEVOCETIRIZINE (XYZAL) 5 MG TABLET    Take 1 tablet (5 mg total) by mouth every evening.    METOPROLOL SUCCINATE (TOPROL-XL) 25 MG 24 HR TABLET    Take 1 tablet (25 mg total) by mouth once daily.    MONTELUKAST (SINGULAIR) 10 MG TABLET    Take 10 mg by mouth.    NITROGLYCERIN (NITROSTAT) 0.4 MG SL TABLET    Place 0.4 mg under the tongue.     "OMEPRAZOLE (PRILOSEC OTC) 20 MG TABLET        PREDNISONE (DELTASONE) 20 MG TABLET    Take 20 mg by mouth 2 (two) times daily.     Review of Systems   Integumentary:  Positive for wound.      Objective:   /78 (BP Location: Right arm, Patient Position: Sitting, BP Method: Small (Manual))   Pulse 65   Temp 97.7 °F (36.5 °C) (Temporal)   Resp 20   Ht 5' 4" (1.626 m)   Wt 74 kg (163 lb 2.3 oz)   SpO2 97%   BMI 28.00 kg/m²       Physical Exam  Vitals reviewed.   Constitutional:       Appearance: Normal appearance.   HENT:      Head:        Comments: Laceration to right forehead with sutures in place. Staples to top of head. Both have healed/scabbed. No erythema/edema/drainage/bleeding.  Pulmonary:      Effort: Pulmonary effort is normal.   Neurological:      Mental Status: He is alert and oriented to person, place, and time.     Assessment and Plan:     1. Encounter for removal of sutures    2. Encounter for removal of staples    Suture Removal    Date/Time: 4/17/2023 3:30 PM  Location procedure was performed: [facility]  Performed by: Deloris Gaxiola NP  Authorized by: Deloris Gaxiola NP   Body area: head/neck  Location details: scalp  Wound Appearance: clean, well healed and no drainage  Sutures Removed: 9  Staples Removed: 5  Post-removal: no dressing applied  Complications: No  Specimens: No  Implants: No  Patient tolerance: Patient tolerated the procedure well with no immediate complications      Patient advised that should he discover any other sutures/staples underlying scabbing of wounds to follow up in clinic. Small amount of blood noted to right forehead laceration with suture removal - stopped with application of pressure. Advised should the wounds begin bleeding to apply pressure x 3-5 minutes and follow up in clinic. Patient and family member verbalized understanding.       "

## 2023-04-17 NOTE — PROGRESS NOTES
Normal IgE.  Tdy=[id number]    Mr. Dewitt,    I reviewed your lab results and on the whole, everything looks normal.   I hope the Advair is helping with your cough.    Keep me posted.     Dr. MADRIGAL

## 2023-06-01 ENCOUNTER — PATIENT MESSAGE (OUTPATIENT)
Dept: PULMONOLOGY | Facility: CLINIC | Age: 63
End: 2023-06-01
Payer: COMMERCIAL

## 2023-06-04 ENCOUNTER — PATIENT MESSAGE (OUTPATIENT)
Dept: PULMONOLOGY | Facility: CLINIC | Age: 63
End: 2023-06-04
Payer: COMMERCIAL

## 2023-06-04 ENCOUNTER — PATIENT MESSAGE (OUTPATIENT)
Dept: INTERNAL MEDICINE | Facility: CLINIC | Age: 63
End: 2023-06-04
Payer: COMMERCIAL

## 2023-06-05 ENCOUNTER — PATIENT MESSAGE (OUTPATIENT)
Dept: INTERNAL MEDICINE | Facility: CLINIC | Age: 63
End: 2023-06-05
Payer: COMMERCIAL

## 2023-06-05 ENCOUNTER — HOSPITAL ENCOUNTER (OUTPATIENT)
Facility: HOSPITAL | Age: 63
Discharge: HOME | End: 2023-06-06
Attending: EMERGENCY MEDICINE | Admitting: FAMILY MEDICINE
Payer: COMMERCIAL

## 2023-06-05 ENCOUNTER — PATIENT MESSAGE (OUTPATIENT)
Dept: PULMONOLOGY | Facility: CLINIC | Age: 63
End: 2023-06-05
Payer: COMMERCIAL

## 2023-06-05 DIAGNOSIS — I50.22 CHRONIC SYSTOLIC CONGESTIVE HEART FAILURE: ICD-10-CM

## 2023-06-05 DIAGNOSIS — J98.09 RECURRENT BRONCHOSPASM: ICD-10-CM

## 2023-06-05 DIAGNOSIS — J98.01 BRONCHOSPASM: ICD-10-CM

## 2023-06-05 DIAGNOSIS — J20.9 ACUTE BRONCHITIS, UNSPECIFIED ORGANISM: Primary | ICD-10-CM

## 2023-06-05 DIAGNOSIS — R07.9 CHEST PAIN: ICD-10-CM

## 2023-06-05 LAB
ALBUMIN SERPL BCP-MCNC: 4.3 G/DL (ref 3.5–5.2)
ALLENS TEST: ABNORMAL
ALP SERPL-CCNC: 117 U/L (ref 55–135)
ALT SERPL W/O P-5'-P-CCNC: 16 U/L (ref 10–44)
ANION GAP SERPL CALC-SCNC: 13 MMOL/L (ref 8–16)
AST SERPL-CCNC: 20 U/L (ref 10–40)
BASOPHILS # BLD AUTO: 0.09 K/UL (ref 0–0.2)
BASOPHILS NFR BLD: 1 % (ref 0–1.9)
BILIRUB SERPL-MCNC: 0.6 MG/DL (ref 0.1–1)
BNP SERPL-MCNC: 55 PG/ML (ref 0–99)
BUN SERPL-MCNC: 9 MG/DL (ref 8–23)
CALCIUM SERPL-MCNC: 9.9 MG/DL (ref 8.7–10.5)
CHLORIDE SERPL-SCNC: 104 MMOL/L (ref 95–110)
CO2 SERPL-SCNC: 24 MMOL/L (ref 23–29)
CREAT SERPL-MCNC: 0.9 MG/DL (ref 0.5–1.4)
DIFFERENTIAL METHOD: ABNORMAL
EOSINOPHIL # BLD AUTO: 0.4 K/UL (ref 0–0.5)
EOSINOPHIL NFR BLD: 4.3 % (ref 0–8)
ERYTHROCYTE [DISTWIDTH] IN BLOOD BY AUTOMATED COUNT: 14.2 % (ref 11.5–14.5)
EST. GFR  (NO RACE VARIABLE): >60 ML/MIN/1.73 M^2
GLUCOSE SERPL-MCNC: 103 MG/DL (ref 70–110)
HCO3 UR-SCNC: 23.1 MMOL/L (ref 24–28)
HCT VFR BLD AUTO: 44.8 % (ref 40–54)
HGB BLD-MCNC: 14.2 G/DL (ref 14–18)
IMM GRANULOCYTES # BLD AUTO: 0.03 K/UL (ref 0–0.04)
IMM GRANULOCYTES NFR BLD AUTO: 0.3 % (ref 0–0.5)
LYMPHOCYTES # BLD AUTO: 2.1 K/UL (ref 1–4.8)
LYMPHOCYTES NFR BLD: 22.7 % (ref 18–48)
MCH RBC QN AUTO: 25.2 PG (ref 27–31)
MCHC RBC AUTO-ENTMCNC: 31.7 G/DL (ref 32–36)
MCV RBC AUTO: 80 FL (ref 82–98)
MONOCYTES # BLD AUTO: 0.8 K/UL (ref 0.3–1)
MONOCYTES NFR BLD: 8.4 % (ref 4–15)
NEUTROPHILS # BLD AUTO: 6 K/UL (ref 1.8–7.7)
NEUTROPHILS NFR BLD: 63.3 % (ref 38–73)
NRBC BLD-RTO: 0 /100 WBC
PCO2 BLDA: 40.5 MMHG (ref 35–45)
PH SMN: 7.36 [PH] (ref 7.35–7.45)
PLATELET # BLD AUTO: 331 K/UL (ref 150–450)
PMV BLD AUTO: 10.4 FL (ref 9.2–12.9)
PO2 BLDA: 23 MMHG (ref 40–60)
POC BE: -2 MMOL/L
POC CARDIAC TROPONIN I: 0 NG/ML (ref 0–0.08)
POC SATURATED O2: 39 % (ref 95–100)
POC TCO2: 24 MMOL/L (ref 24–29)
POTASSIUM SERPL-SCNC: 4.3 MMOL/L (ref 3.5–5.1)
PROT SERPL-MCNC: 8.1 G/DL (ref 6–8.4)
RBC # BLD AUTO: 5.63 M/UL (ref 4.6–6.2)
SAMPLE: ABNORMAL
SAMPLE: NORMAL
SARS-COV-2 RDRP RESP QL NAA+PROBE: NEGATIVE
SITE: ABNORMAL
SODIUM SERPL-SCNC: 141 MMOL/L (ref 136–145)
TROPONIN I SERPL DL<=0.01 NG/ML-MCNC: <0.006 NG/ML (ref 0–0.03)
WBC # BLD AUTO: 9.44 K/UL (ref 3.9–12.7)

## 2023-06-05 PROCEDURE — 94640 AIRWAY INHALATION TREATMENT: CPT | Mod: XB

## 2023-06-05 PROCEDURE — U0002 COVID-19 LAB TEST NON-CDC: HCPCS | Performed by: PHYSICIAN ASSISTANT

## 2023-06-05 PROCEDURE — 25000003 PHARM REV CODE 250: Performed by: PHYSICIAN ASSISTANT

## 2023-06-05 PROCEDURE — 84484 ASSAY OF TROPONIN QUANT: CPT | Performed by: PHYSICIAN ASSISTANT

## 2023-06-05 PROCEDURE — 99285 EMERGENCY DEPT VISIT HI MDM: CPT | Mod: 25

## 2023-06-05 PROCEDURE — 93010 EKG 12-LEAD: ICD-10-PCS | Mod: ,,, | Performed by: INTERNAL MEDICINE

## 2023-06-05 PROCEDURE — 80053 COMPREHEN METABOLIC PANEL: CPT | Performed by: PHYSICIAN ASSISTANT

## 2023-06-05 PROCEDURE — 99291 CRITICAL CARE FIRST HOUR: CPT | Mod: ,,, | Performed by: EMERGENCY MEDICINE

## 2023-06-05 PROCEDURE — 83880 ASSAY OF NATRIURETIC PEPTIDE: CPT | Performed by: PHYSICIAN ASSISTANT

## 2023-06-05 PROCEDURE — 93010 ELECTROCARDIOGRAM REPORT: CPT | Mod: 76,,, | Performed by: INTERNAL MEDICINE

## 2023-06-05 PROCEDURE — 63600175 PHARM REV CODE 636 W HCPCS: Performed by: EMERGENCY MEDICINE

## 2023-06-05 PROCEDURE — 94761 N-INVAS EAR/PLS OXIMETRY MLT: CPT

## 2023-06-05 PROCEDURE — 96374 THER/PROPH/DIAG INJ IV PUSH: CPT

## 2023-06-05 PROCEDURE — 93005 ELECTROCARDIOGRAM TRACING: CPT

## 2023-06-05 PROCEDURE — G0378 HOSPITAL OBSERVATION PER HR: HCPCS

## 2023-06-05 PROCEDURE — 25500020 PHARM REV CODE 255: Performed by: EMERGENCY MEDICINE

## 2023-06-05 PROCEDURE — 25000242 PHARM REV CODE 250 ALT 637 W/ HCPCS: Performed by: EMERGENCY MEDICINE

## 2023-06-05 PROCEDURE — 25000242 PHARM REV CODE 250 ALT 637 W/ HCPCS: Performed by: PHYSICIAN ASSISTANT

## 2023-06-05 PROCEDURE — 99291 PR CRITICAL CARE, E/M 30-74 MINUTES: ICD-10-PCS | Mod: ,,, | Performed by: EMERGENCY MEDICINE

## 2023-06-05 PROCEDURE — 85025 COMPLETE CBC W/AUTO DIFF WBC: CPT | Performed by: PHYSICIAN ASSISTANT

## 2023-06-05 PROCEDURE — 87641 MR-STAPH DNA AMP PROBE: CPT | Performed by: EMERGENCY MEDICINE

## 2023-06-05 PROCEDURE — 93010 ELECTROCARDIOGRAM REPORT: CPT | Mod: ,,, | Performed by: INTERNAL MEDICINE

## 2023-06-05 RX ORDER — AMOXICILLIN 500 MG
1 CAPSULE ORAL WEEKLY
COMMUNITY

## 2023-06-05 RX ORDER — METOPROLOL SUCCINATE 25 MG/1
25 TABLET, EXTENDED RELEASE ORAL DAILY
Status: DISCONTINUED | OUTPATIENT
Start: 2023-06-06 | End: 2023-06-06

## 2023-06-05 RX ORDER — IPRATROPIUM BROMIDE AND ALBUTEROL SULFATE 2.5; .5 MG/3ML; MG/3ML
3 SOLUTION RESPIRATORY (INHALATION) EVERY 4 HOURS
Status: DISCONTINUED | OUTPATIENT
Start: 2023-06-05 | End: 2023-06-06 | Stop reason: HOSPADM

## 2023-06-05 RX ORDER — IPRATROPIUM BROMIDE AND ALBUTEROL SULFATE 2.5; .5 MG/3ML; MG/3ML
3 SOLUTION RESPIRATORY (INHALATION)
Status: COMPLETED | OUTPATIENT
Start: 2023-06-05 | End: 2023-06-05

## 2023-06-05 RX ORDER — SODIUM CHLORIDE 0.9 % (FLUSH) 0.9 %
10 SYRINGE (ML) INJECTION
Status: DISCONTINUED | OUTPATIENT
Start: 2023-06-05 | End: 2023-06-06 | Stop reason: HOSPADM

## 2023-06-05 RX ORDER — IBUPROFEN 200 MG
400 TABLET ORAL DAILY PRN
COMMUNITY

## 2023-06-05 RX ORDER — TALC
6 POWDER (GRAM) TOPICAL NIGHTLY PRN
Status: DISCONTINUED | OUTPATIENT
Start: 2023-06-05 | End: 2023-06-06 | Stop reason: HOSPADM

## 2023-06-05 RX ORDER — PREDNISONE 20 MG/1
40 TABLET ORAL
Status: COMPLETED | OUTPATIENT
Start: 2023-06-06 | End: 2023-06-06

## 2023-06-05 RX ORDER — BENZONATATE 100 MG/1
100 CAPSULE ORAL EVERY 8 HOURS PRN
Status: DISCONTINUED | OUTPATIENT
Start: 2023-06-05 | End: 2023-06-06 | Stop reason: HOSPADM

## 2023-06-05 RX ORDER — METHYLPREDNISOLONE SOD SUCC 125 MG
125 VIAL (EA) INJECTION
Status: COMPLETED | OUTPATIENT
Start: 2023-06-05 | End: 2023-06-05

## 2023-06-05 RX ADMIN — IPRATROPIUM BROMIDE AND ALBUTEROL SULFATE 3 ML: .5; 3 SOLUTION RESPIRATORY (INHALATION) at 12:06

## 2023-06-05 RX ADMIN — METHYLPREDNISOLONE SODIUM SUCCINATE 125 MG: 125 INJECTION, POWDER, FOR SOLUTION INTRAMUSCULAR; INTRAVENOUS at 01:06

## 2023-06-05 RX ADMIN — IOHEXOL 75 ML: 350 INJECTION, SOLUTION INTRAVENOUS at 09:06

## 2023-06-05 RX ADMIN — IPRATROPIUM BROMIDE AND ALBUTEROL SULFATE 3 ML: .5; 3 SOLUTION RESPIRATORY (INHALATION) at 01:06

## 2023-06-05 RX ADMIN — IPRATROPIUM BROMIDE AND ALBUTEROL SULFATE 3 ML: .5; 3 SOLUTION RESPIRATORY (INHALATION) at 08:06

## 2023-06-05 RX ADMIN — TICAGRELOR 90 MG: 90 TABLET ORAL at 09:06

## 2023-06-05 RX ADMIN — IPRATROPIUM BROMIDE AND ALBUTEROL SULFATE 3 ML: .5; 3 SOLUTION RESPIRATORY (INHALATION) at 11:06

## 2023-06-05 RX ADMIN — DIPHENHYDRAMINE HYDROCHLORIDE 10 ML: 25 SOLUTION ORAL at 11:06

## 2023-06-05 NOTE — H&P
ED Observation Unit  History and Physical      I assumed care of this patient from the Main ED at onset of observation time, 1519 on 06/05/2023.       History of Present Illness:    63-year-old male with a history of CHF with an EF of 30%, CAD, HLD, who presents to [facility] Ed on 6/5/2023 for emergent evaluation of cough, SOB, and chest pain associated with a sore throat.    Patient states that sore throat is associated with the development of ulcerative lesions in the back of his throat.  In addition he states that his shortness of breath has worsened to the point that he can barely walk across a room without having to stop.  He has had a new cough for the past several months which has been worked up in the outpatient setting including PFTs which were normal and a chest x-ray which was unremarkable.    In the ED, VSS, ECG with NSR at 76 bpm. R glynn axis. Nonspecific T wave inversion in lead III. Normal intervals. No STEMI. No leukocytosis, left shift, electrolyte abn, GIRMA, liver dysfunction, or signs of ACS or CHF. Patient given breathing treatment and solumedrol 125 mg IV.     I reviewed the ED Provider Note dated 6/52023 prior to my evaluation of this patient.  I reviewed all labs and imaging performed in the Main ED, prior to patient being placed in Observation. Patient was placed in the ED Observation Unit for acute viral bronchitis.    PMHx   Past Medical History:   Diagnosis Date    CAD (coronary artery disease)     CHF (congestive heart failure), NYHA class I     Hyperlipidemia       No past surgical history on file.     Family Hx   Family History   Problem Relation Age of Onset    Diabetes Father     Diabetes Brother     Heart disease Brother         Social Hx   Social History     Socioeconomic History    Marital status:    Tobacco Use    Smoking status: Never     Passive exposure: Never    Smokeless tobacco: Never   Substance and Sexual Activity    Alcohol use: Never    Drug use: Never    Sexual  activity: Yes     Partners: Female     Birth control/protection: None     Comment: Partner post menopausal        Vital Signs   Vitals:    06/05/23 1245 06/05/23 1301 06/05/23 1445 06/05/23 1607   BP:   101/61    BP Location:       Patient Position:       Pulse: 80 82 82 72   Resp: 17 20 16    Temp:       SpO2: 95% 95% 95% (!) 92%   Weight:       Height:            Review of Systems  Review of Systems   Constitutional:  Positive for diaphoresis. Negative for chills, fever and weight loss.   Respiratory:  Positive for cough, shortness of breath and wheezing. Negative for hemoptysis.    Cardiovascular:  Positive for chest pain (with cough).   Gastrointestinal:  Negative for diarrhea and vomiting.   Neurological:  Positive for dizziness and weakness. Negative for headaches.     Physical Exam  Physical Exam  Constitutional:       General: He is not in acute distress.     Appearance: Normal appearance. He is not ill-appearing, toxic-appearing or diaphoretic.   HENT:      Head: Atraumatic.   Eyes:      Extraocular Movements: Extraocular movements intact.   Cardiovascular:      Rate and Rhythm: Normal rate and regular rhythm.   Pulmonary:      Effort: Pulmonary effort is normal.      Breath sounds: Wheezing (insp, diffuse) present. No rhonchi or rales.      Comments: Coughing on exam.  Musculoskeletal:      Cervical back: Normal range of motion.      Right lower leg: No edema.      Left lower leg: No edema.   Neurological:      Mental Status: He is alert.       Medications:   Scheduled Meds:   albuterol-ipratropium  3 mL Nebulization Q4H    [START ON 6/6/2023] metoprolol succinate  25 mg Oral Daily    [START ON 6/6/2023] predniSONE  40 mg Oral ED 1 Time    sacubitriL-valsartan  1 tablet Oral BID    ticagrelor  90 mg Oral BID     Continuous Infusions:  PRN Meds:.benzonatate      Assessment/Plan:    Acute bronchitis:  -In the ED, VSS, ECG with NSR at 76 bpm. R glynn axis. Nonspecific T wave inversion in lead III. Normal  intervals. No STEMI. No leukocytosis, left shift, electrolyte abn, GIRMA, liver dysfunction, or signs of ACS or CHF. CXR clear.   -Duoneb x3 and solumedrol 125 mg IV given in the ED  -Likely viral. Resp viral panel in process.  -Patient desatted to 92% on RA after speaking and deep breathing for lung exam. Will place on oxygen for comfort.   -CTA PE study ordered for further investigation.  -Duo neb q4 hours.   -Continue prednisone 40 mg po tomorrow. D/c with steroids po if stable for home.    CHF:   -Exam and work up do not suggest acute CHF   -Continue Entreso. Patient's family would like to bring the rest of his medication (farxiga, etc.) for patient to take here since not in formulary.    CAD:   -Continue brilinta bid.          Case was discussed with the ED provider, Dr. Lala.       4:25 PM  Siria Kimbrough PA-C  Emergent Department  [facility]  Spectralink #[id number] or #[phone]

## 2023-06-05 NOTE — ED NOTES
..Patient identifiers for Gordon Dewitt 63 y.o. male checked and correct.  Chief Complaint   Patient presents with    Cough     Cough and intermittent cp x3 days.     Past Medical History:   Diagnosis Date    CAD (coronary artery disease)     CHF (congestive heart failure), NYHA class I     Hyperlipidemia      Allergies reported: Review of patient's allergies indicates:  No Known Allergies      LOC: Patient is awake, alert, and aware of environment with an appropriate affect. Patient is oriented x 3 and speaking appropriately.  APPEARANCE: Patient resting comfortably and in no acute distress. Patient is clean and well groomed, patient's clothing is properly fastened.  HEENT: **AAO -ambulated from triage to room 34. Wheezing and sob --Seen recently at other area hospital for the same thing. Also c/o sores on tongue for the last 2 days   SKIN: The skin is warm and dry. Patient has normal skin turgor and moist mucus membranes. Skin is intact; no bruising or breakdown noted.  MUSKULOSKELETAL: Patient is moving all extremities well, no obvious deformities noted. Pulses intact.   RESPIRATORY: Airway is open and patent. Respirations are spontaneous -Audible wheezing heard   CARDIAC: Patient has a normal rate and rhythm. NSR on cardiac monitor,No peripheral edema noted.   ABDOMEN: No distention noted. Bowel sounds active in all 4 quadrants. Soft and non-tender upon palpation.  NEUROLOGICAL:  Facial expression is symmetrical. Hand grasps are equal bilaterally. Normal sensation in all extremities when touched with finger.

## 2023-06-05 NOTE — ED PROVIDER NOTES
Encounter Date: 6/5/2023       History     Chief Complaint   Patient presents with    Cough     Cough and intermittent cp x3 days.     63-year-old male with a history of CHF with an EF of 30%, CAD, complaining of shortness of breath, worsening for the past 4-5 days, associated with a sore throat and cough.  Patient states that sore throat is associated with the development of ulcerative lesions in the back of his throat.  In addition he states that his shortness of breath has worsened to the point that he can barely walk across a room without having to stop.  He has had a new cough for the past several months which has been worked up in the outpatient setting including PFTs which were normal and a chest x-ray which was unremarkable.    The history is provided by the patient.   Review of patient's allergies indicates:  No Known Allergies  Past Medical History:   Diagnosis Date    CAD (coronary artery disease)     CHF (congestive heart failure), NYHA class I     Hyperlipidemia      No past surgical history on file.  Family History   Problem Relation Age of Onset    Diabetes Father     Diabetes Brother     Heart disease Brother      Social History     Tobacco Use    Smoking status: Never     Passive exposure: Never    Smokeless tobacco: Never   Substance Use Topics    Alcohol use: Never    Drug use: Never     Review of Systems    Physical Exam     Initial Vitals [06/05/23 1156]   BP Pulse Resp Temp SpO2   110/71 74 20 98.4 °F (36.9 °C) 97 %      MAP       --         Physical Exam    Nursing note and vitals reviewed.  Constitutional: Vital signs are normal. He appears well-developed and well-nourished. He is not diaphoretic.  Non-toxic appearance. He does not appear ill. No distress.   HENT:   Mouth/Throat: Uvula is midline and mucous membranes are normal. Mucous membranes are not dry. Oral lesions present. No trismus in the jaw. No dental abscesses or uvula swelling.   Patient with multiple ulcerative lesions in the  peritonsillar region, soft palate and at the base of his tongue and the floor of the his mouth    In addition he has a couple of pustular lesions in the perioral region of his face   Eyes: Conjunctivae and lids are normal.   Neck: Neck supple.   Normal range of motion.  Cardiovascular:  Normal rate.           Pulmonary/Chest: Tachypnea noted. No respiratory distress. He has wheezes. He has no rhonchi.   Musculoskeletal:         General: No edema.      Cervical back: Normal range of motion and neck supple.     Neurological: He is alert and oriented to person, place, and time.   Skin: Skin is warm, dry and intact. No pallor.   Psychiatric: He has a normal mood and affect. His speech is normal and behavior is normal.       ED Course   Procedures  Labs Reviewed   CBC W/ AUTO DIFFERENTIAL - Abnormal; Notable for the following components:       Result Value    MCV 80 (*)     MCH 25.2 (*)     MCHC 31.7 (*)     All other components within normal limits   ISTAT PROCEDURE - Abnormal; Notable for the following components:    POC PO2 23 (*)     POC HCO3 23.1 (*)     POC SATURATED O2 39 (*)     All other components within normal limits   COMPREHENSIVE METABOLIC PANEL   TROPONIN I   B-TYPE NATRIURETIC PEPTIDE   SARS-COV-2 RNA AMPLIFICATION, QUAL   TROPONIN ISTAT   RESPIRATORY PATHOGENS PANEL (TEM-PCR)   POCT TROPONIN        ECG Results              EKG 12-lead (Final result)  Result time 06/05/23 13:38:48      Final result by Interface, Lab In [facility] (06/05/23 13:38:48)                   Narrative:    Test Reason : R07.9,    Vent. Rate : 072 BPM     Atrial Rate : 072 BPM     P-R Int : 134 ms          QRS Dur : 108 ms      QT Int : 382 ms       P-R-T Axes : 072 080 030 degrees     QTc Int : 418 ms    Normal sinus rhythm  When compared with ECG of 05-JUN-2023 12:05,  No significant change was found  Confirmed by MONALISA JAIME MD ([id number]) on 6/5/2023 1:38:37 PM    Referred By: [email or web address]   SELF           Confirmed By:MONALISA JAIME MD                                   Imaging Results              X-Ray Chest AP Portable (Final result)  Result time 06/05/23 14:48:04      Final result by Russ Wilkes Jr., MD (06/05/23 14:48:04)                   Impression:      No acute cardiopulmonary disease      Electronically signed by: Russ Guillen Jr  Date:    06/05/2023  Time:    14:48               Narrative:    EXAMINATION:  XR CHEST AP PORTABLE    CLINICAL HISTORY:  Chest Pain;    TECHNIQUE:  Single frontal view of the chest was performed.    COMPARISON:  None    FINDINGS:  Cardiomediastinal silhouettewithin normal limits for age.    Lungs grossly clear within limitations of portable technique    Pleura, diaphragm, and thoracic cage grossly unremarkable.                                       Medications   albuterol-ipratropium 2.5 mg-0.5 mg/3 mL nebulizer solution 3 mL (3 mLs Nebulization Given 6/5/23 2032)   predniSONE tablet 40 mg (has no administration in time range)   benzonatate capsule 100 mg (has no administration in time range)   ticagrelor tablet 90 mg (has no administration in time range)   sacubitriL-valsartan 24-26 mg per tablet 1 tablet (has no administration in time range)   metoprolol succinate (TOPROL-XL) 24 hr tablet 25 mg (has no administration in time range)   albuterol-ipratropium 2.5 mg-0.5 mg/3 mL nebulizer solution 3 mL (3 mLs Nebulization Given 6/5/23 1301)   methylPREDNISolone sodium succinate injection 125 mg (125 mg Intravenous Given 6/5/23 1313)     Medical Decision Making:   History:   Old Medical Records: I decided to obtain old medical records.  Old Records Summarized: records from clinic visits and records from previous admission(s).  Initial Assessment:   DDx for SOB would be broad.  It would include serious conditions such as CHF, PE, ACS, pneumothorax, pleural effusion, pericardial effusion, severe anemia, asthma and/or COPD exacerbation and pneumonia.  Other less serious processes like anxiety/panic attack are  also a possibility.    I think the most likely etiology of this patient's SOB is some sort of reactive airway disease process given his significant diffuse wheezing on exam.  In addition he has vesicular and ulcerative lesions in his oropharynx which are concerning for either an autoimmune process or an acute viral syndrome    The ED work-up for this patient's SOB will include the following interventions:  -EKG  -Labs:  CBC, CMP, COVID, BNP  -Imaging:  Chest x-ray  -duo nebs Solu-Medrol  -Clinical reassessment  -Disposition:  Likely admission    Independently Interpreted Test(s):   I have ordered and independently interpreted X-rays - see prior notes.  I have ordered and independently interpreted EKG Reading(s) - see prior notes  Clinical Tests:   Lab Tests: Ordered and Reviewed  Radiological Study: Reviewed and Ordered  Medical Tests: Ordered and Reviewed  Other:   I have discussed this case with another health care provider.       <> Summary of the Discussion: Hospitalist and ED PA          Attending Attestation:         Attending Critical Care:   Critical Care Times:   ==============================================================  Total Critical Care Time - exclusive of procedural time: 30 minutes.  ==============================================================  Critical care was necessary to treat or prevent imminent or life-threatening deterioration of the following conditions: respiratory failure.   The following critical care procedures were done by me (see procedure notes): pulse oximetry.   Critical care was time spent personally by me on the following activities: obtaining history from patient or relative, examination of patient, review of old charts, development of treatment plan with patient or relative, review of x-rays / CT sent with the patient, ordering lab, x-rays, and/or EKG, ordering and performing treatments and interventions, evaluation of patient's response to treatment, discussion with  consultants and re-evaluation of patient's conition.   Critical Care Condition: potentially life-threatening         ED Course as of 06/05/23 2051   Mon Jun 05, 2023   1429 Labs largely unremarkable.  Patient reassessed and still has diffuse wheezing in his exam.  I ambulated him myself around the ED, his O2 dropped to 94-95% but he became incredibly dyspneic with worsening wheezing.  Will admit for observation.  Will hold off on antibiotics for now as I suspect underlying viral illness.  For now, managing his acute viral bronchitis. [AS]   1517 Initial plan for admission to Hospital Medicine for observation.  On discussion with Hospital Medicine they suggest the patient might do well in the ED OU with a 24 hours stay and I agree that this is a reasonable 1st step for the patient.  Will plan to continue DuoNebs q.4 hours, obtain a viral panel on the recommendation of Hospital Medicine and if he is improved tomorrow and can ambulate without any difficulty, will plan to return home with 5 days of steroids and if his shortness of breath has worsened he can be admitted to Hospital Medicine at that point in time. [AS]      ED Course User Index  [AS] Dia Lala MD                 Clinical Impression:   Final diagnoses:  [R07.9] Chest pain  [J20.9] Acute bronchitis, unspecified organism (Primary)        ED Disposition Condition    Observation Stable                Dia Lala MD  06/05/23 2051

## 2023-06-05 NOTE — PLAN OF CARE
Pt was out of room when SW attempted initial assessment      Alma Delia Rodarte CD, MSW, LMSW, RSW   Case Management  [facility]  Email: [email or web address]

## 2023-06-05 NOTE — PHARMACY MED REC
"Admission Medication History     The home medication history was taken by Geoff Keith.    You may go to "Admission" then "Reconcile Home Medications" tabs to review and/or act upon these items.     The home medication list has been updated by the Pharmacy department.   Please read ALL comments highlighted in yellow.   Please address this information as you see fit.    Feel free to contact us if you have any questions or require assistance.      The medications listed below were removed from the home medication list. Please reorder if appropriate:  Patient reports no longer taking the following medication(s):  CLOTRIMAZOLE-BETAMETHASONE 1-0.05 % CRM  OMEPRAZOLE OTC 20 MG    Medications listed below were obtained from: Patient  Current Outpatient Medications on File Prior to Encounter   Medication Sig    albuterol (PROVENTIL/VENTOLIN HFA) 90 mcg/actuation inhaler   2 puffs every 4 (four) hours as needed.    aspirin 81 MG Chew   Take 81 mg by mouth once daily.    atorvastatin (LIPITOR) 40 MG tablet   Take 1 tablet (40 mg total) by mouth every evening.    azelastine (ASTELIN) 137 mcg (0.1 %) nasal spray   1 spray by Nasal route 2 (two) times daily as needed for Rhinitis.    benzocaine/menthol/zinc chlor (ORAJEL 3X MOUTH SORES MM)   by Mucous Membrane route. Apply topically to mouth blisters daily.    BRILINTA 90 mg tablet Take 1 tablet (90 mg total) by mouth 2 (two) times daily.      dapagliflozin (FARXIGA) 10 mg tablet   Take 1 tablet (10 mg total) by mouth once daily at 6am.    ENTRESTO 24-26 mg per tablet   Take 1 tablet by mouth 2 (two) times daily.    fluticasone propionate (FLONASE) 50 mcg/actuation nasal spray   1 spray by Each Nostril route daily as needed for Rhinitis.    fluticasone-salmeterol diskus inhaler 250-50 mcg   Inhale 1 puff into the lungs 2 (two) times daily. Controller    ibuprofen (ADVIL,MOTRIN) 200 MG tablet   Take 400 mg by mouth daily as needed for Pain.    levocetirizine (XYZAL) 5 MG tablet   " Take 1 tablet (5 mg total) by mouth every evening.    metoprolol succinate (TOPROL-XL) 25 MG 24 hr tablet   Take 1 tablet (25 mg total) by mouth once daily.    montelukast (SINGULAIR) 10 mg tablet   Take 10 mg by mouth once daily.    omega-3 fatty acids/fish oil (FISH OIL-OMEGA-3 FATTY ACIDS) 300-1,000 mg capsule   Take 1 capsule by mouth once a week.    nitroGLYCERIN (NITROSTAT) 0.4 MG SL tablet   Place 0.4 mg under the tongue. Has on hand if needed.    predniSONE (DELTASONE) 20 MG tablet   Take 20 mg by mouth 2 (two) times daily.     Potential issues to be addressed PRIOR TO DISCHARGE  Patient reported not taking the following medications: (PREDISONE). These medications remain on the home medication list. Please address accordingly.             Geoff Keith  EXT [phone]                  .

## 2023-06-05 NOTE — PROGRESS NOTES
ED Observation Unit  Progress Note      HPI   63-year-old male with a history of CHF with an EF of 30%, CAD, HLD, who presents to [facility] on 6/5/2023 for emergent evaluation of cough, SOB, and chest pain associated with a sore throat.     Patient states that sore throat is associated with the development of ulcerative lesions in the back of his throat.  In addition he states that his shortness of breath has worsened to the point that he can barely walk across a room without having to stop.  He has had a new cough for the past several months which has been worked up in the outpatient setting including PFTs which were normal and a chest x-ray which was unremarkable.     In the ED, VSS, ECG with NSR at 76 bpm. R glynn axis. Nonspecific T wave inversion in lead III. Normal intervals. No STEMI. No leukocytosis, left shift, electrolyte abn, GIRMA, liver dysfunction, or signs of ACS or CHF. Patient given breathing treatment and solumedrol 125 mg IV.    Interval History   Resting comfortable in stretcher. Reports SOB has improved. Denies any current chest pain.    Most recent BP 97/55 - on chart review, his baseline is in the low 100s systolic. He is well appearing, denies any lightheadedness. Due to CHF with EF 30% will not bolus IVF at this time.     PMHx   Past Medical History:   Diagnosis Date    CAD (coronary artery disease)     CHF (congestive heart failure), NYHA class I     Hyperlipidemia       No past surgical history on file.     Family Hx   Family History   Problem Relation Age of Onset    Diabetes Father     Diabetes Brother     Heart disease Brother         Social Hx   Social History     Socioeconomic History    Marital status:    Tobacco Use    Smoking status: Never     Passive exposure: Never    Smokeless tobacco: Never   Substance and Sexual Activity    Alcohol use: Never    Drug use: Never    Sexual activity: Yes     Partners: Female     Birth control/protection: None     Comment: Partner post menopausal  "       Vital Signs   Vitals:    06/05/23 1607 06/05/23 1707 06/05/23 1827 06/05/23 1830   BP:  (!) 95/54  (!) 95/54   BP Location:       Patient Position:       Pulse: 72 79  79   Resp:  17  17   Temp:  99 °F (37.2 °C)  99 °F (37.2 °C)   SpO2: (!) 92% 97% 95% 95%   Weight:    68 kg (150 lb)   Height:    5' 4.02" (1.626 m)        Review of Systems  Review of Systems   Respiratory:  Positive for cough, shortness of breath and wheezing.    Cardiovascular:  Negative for chest pain and leg swelling.   Neurological:  Positive for weakness.     Brief Physical Exam/Reassessment   Physical Exam  Vitals and nursing note reviewed.   Constitutional:       Appearance: Normal appearance.   HENT:      Head: Normocephalic and atraumatic.   Cardiovascular:      Rate and Rhythm: Normal rate and regular rhythm.      Heart sounds: No murmur heard.     Comments: No LE edema  Pulmonary:      Breath sounds: No wheezing or rhonchi.      Comments: No wheezing on my exam. Decreased breath sounds to the R lower lung  Musculoskeletal:      Cervical back: Normal range of motion and neck supple.   Skin:     General: Skin is warm and dry.   Neurological:      Mental Status: He is alert and oriented to person, place, and time.   Psychiatric:         Mood and Affect: Mood normal.         Behavior: Behavior normal.       Labs/Imaging   Labs Reviewed   CBC W/ AUTO DIFFERENTIAL - Abnormal; Notable for the following components:       Result Value    MCV 80 (*)     MCH 25.2 (*)     MCHC 31.7 (*)     All other components within normal limits   ISTAT PROCEDURE - Abnormal; Notable for the following components:    POC PO2 23 (*)     POC HCO3 23.1 (*)     POC SATURATED O2 39 (*)     All other components within normal limits   COMPREHENSIVE METABOLIC PANEL   TROPONIN I   B-TYPE NATRIURETIC PEPTIDE   SARS-COV-2 RNA AMPLIFICATION, QUAL   TROPONIN ISTAT   RESPIRATORY PATHOGENS PANEL (TEM-PCR)   POCT TROPONIN      Imaging Results              X-Ray Chest AP " Portable (Final result)  Result time 06/05/23 14:48:04      Final result by Russ Wilkes Jr., MD (06/05/23 14:48:04)                   Impression:      No acute cardiopulmonary disease      Electronically signed by: Russ Guillen Jr  Date:    06/05/2023  Time:    14:48               Narrative:    EXAMINATION:  XR CHEST AP PORTABLE    CLINICAL HISTORY:  Chest Pain;    TECHNIQUE:  Single frontal view of the chest was performed.    COMPARISON:  None    FINDINGS:  Cardiomediastinal silhouettewithin normal limits for age.    Lungs grossly clear within limitations of portable technique    Pleura, diaphragm, and thoracic cage grossly unremarkable.                                       I reviewed all labs, imaging, EKGs.     Plan   Acute Bronchitis  -DuoNeb q4 hours  -prednisone 40mg daily for 4 more days (received IV solumedrol in the ED)  -oxygen prn for comfort   -CTA chest with no PE, no RV strain, Lungs clear without any evidence of PNA    CHF   -no signs of fluid overload on exam, no LE edema. BNP normal. CXR clear.      CAD  -continue Brilinta

## 2023-06-05 NOTE — Clinical Note
Diagnosis: Acute bronchitis, unspecified organism [[id number]]   Admitting Provider:: JARED APODACA III [[id number]]   Future Attending Provider: JARED APODACA III [[id number]]   Reason for IP Medical Treatment  (Clinical interventions that can only be accomplished in the IP setting? ) :: Hypoxia despite treatment   I certify that Inpatient services for greater than or equal to 2 midnights are medically necessary:: Yes   Plans for Post-Acute care--if anticipated (pick the single best option):: A. No post acute care anticipated at this time

## 2023-06-05 NOTE — FIRST PROVIDER EVALUATION
Emergency Department TeleTriage Encounter Note      CHIEF COMPLAINT    Chief Complaint   Patient presents with    Cough     Cough and intermittent cp x3 days.       VITAL SIGNS   Initial Vitals [06/05/23 1156]   BP Pulse Resp Temp SpO2   110/71 74 20 98.4 °F (36.9 °C) 97 %      MAP       --            ALLERGIES    Review of patient's allergies indicates:  No Known Allergies    PROVIDER TRIAGE NOTE  Patient presents with complaint of cough, fever, shortness of breath and chest pain for about a week. Reports cough has been present for months but had improved slightly with advair at one point. Denied LE swelling.      Phy:   Constitutional: well nourished, well developed, appearing stated age, NAD   HEENT: NCAT, symmetrical lids, No obvious facial deformity.  Normal phonation. Normal Conjunctiva   Neck: NAROM   Respiratory: Normal effort.  No obvious use of accessory muscles   Musculoskeletal: Moved upper extremities well   Neuro: Alert, answers questions appropriately    Psych: appropriate mood and affect      Initial orders will be placed and care will be transferred to an alternate provider when patient is roomed for a full evaluation. Any additional orders and the final disposition will be determined by that provider.        ORDERS  Labs Reviewed   CBC W/ AUTO DIFFERENTIAL   COMPREHENSIVE METABOLIC PANEL   TROPONIN I   B-TYPE NATRIURETIC PEPTIDE   SARS-COV-2 RNA AMPLIFICATION, QUAL   POCT TROPONIN       ED Orders (720h ago, onward)      Start Ordered     Status Ordering Provider    06/05/23 1220 06/05/23 1220  Vital signs  Every 15 min         Ordered DAQUAN KUMAR    06/05/23 1220 06/05/23 1220  Cardiac Monitoring - Adult  Continuous        Comments: Notify Physician If:    Ordered DAQUAN KUMAR    06/05/23 1220 06/05/23 1220  Pulse Oximetry Continuous  Continuous         Ordered DAQUAN KUMAR    06/05/23 1220 06/05/23 1220  Diet NPO  Diet effective now         Ordered  EZIO KUMARINE    06/05/23 1220 06/05/23 1220  Saline lock IV  Once         Ordered EZIO KUMARINE    06/05/23 1220 06/05/23 1220  EKG 12-lead  Once        Comments: Do not perform if previously done during this visit/ triage    Ordered EZIO KUMARINE    06/05/23 1220 06/05/23 1220  CBC auto differential  STAT         Ordered EZIO KUMARINE    06/05/23 1220 06/05/23 1220  Comprehensive metabolic panel  STAT         Ordered EZIO KUMARINE    06/05/23 1220 06/05/23 1220  Troponin I #1  STAT        See Hyperspace for full Linked Orders Report.    Ordered EZIO KUMARINE    06/05/23 1220 06/05/23 1220  POCT Troponin #1  Once        See Hyperspace for full Linked Orders Report.    Ordered TANIKA MARSH, DAQUAN    06/05/23 1220 06/05/23 1220  B-Type natriuretic peptide (BNP)  STAT         Ordered EZIO KUMARINE    06/05/23 1220 06/05/23 1220  X-Ray Chest AP Portable  1 time imaging         Ordered EZIO KUMARINE    06/05/23 1220 06/05/23 1220  COVID-19 Rapid Screening  STAT         Ordered EZIO KUMARINE    06/05/23 1158 06/05/23 1157  EKG 12-lead (Chest Pain) Age >30  Once        Comments: If patient > 30 yrs.    Completed by CATHERINE GAVIRIA on 6/5/2023 at 12:11 PM RUBEN ALVA              Virtual Visit Note: The provider triage portion of this emergency department evaluation and documentation was performed via [employer], a HIPAA-compliant telemedicine application, in concert with a tele-presenter in the room. A face to face patient evaluation with one of my colleagues will occur once the patient is placed in an emergency department room.      DISCLAIMER: This note was prepared with [employer] voice recognition transcription software. Garbled syntax, mangled pronouns, and other bizarre constructions may be attributed to that software system.

## 2023-06-06 VITALS
DIASTOLIC BLOOD PRESSURE: 63 MMHG | OXYGEN SATURATION: 93 % | SYSTOLIC BLOOD PRESSURE: 104 MMHG | BODY MASS INDEX: 25.61 KG/M2 | HEIGHT: 64 IN | HEART RATE: 83 BPM | RESPIRATION RATE: 16 BRPM | WEIGHT: 150 LBS | TEMPERATURE: 99 F

## 2023-06-06 PROBLEM — J20.9 BRONCHITIS, ACUTE, WITH BRONCHOSPASM: Status: ACTIVE | Noted: 2023-06-06

## 2023-06-06 PROBLEM — J98.09 RECURRENT BRONCHOSPASM: Status: ACTIVE | Noted: 2023-02-07

## 2023-06-06 PROBLEM — J98.09 RECURRENT BRONCHOSPASM: Status: ACTIVE | Noted: 2023-06-06

## 2023-06-06 PROBLEM — J98.01 COUGH DUE TO BRONCHOSPASM: Status: ACTIVE | Noted: 2023-06-06

## 2023-06-06 PROBLEM — I50.20 HFREF (HEART FAILURE WITH REDUCED EJECTION FRACTION): Status: ACTIVE | Noted: 2022-05-10

## 2023-06-06 LAB
ANION GAP SERPL CALC-SCNC: 10 MMOL/L (ref 8–16)
BASOPHILS # BLD AUTO: 0.02 K/UL (ref 0–0.2)
BASOPHILS NFR BLD: 0.2 % (ref 0–1.9)
BUN SERPL-MCNC: 15 MG/DL (ref 8–23)
CALCIUM SERPL-MCNC: 8.8 MG/DL (ref 8.7–10.5)
CHLORIDE SERPL-SCNC: 108 MMOL/L (ref 95–110)
CO2 SERPL-SCNC: 21 MMOL/L (ref 23–29)
CREAT SERPL-MCNC: 0.7 MG/DL (ref 0.5–1.4)
DIFFERENTIAL METHOD: ABNORMAL
EOSINOPHIL # BLD AUTO: 0 K/UL (ref 0–0.5)
EOSINOPHIL NFR BLD: 0 % (ref 0–8)
ERYTHROCYTE [DISTWIDTH] IN BLOOD BY AUTOMATED COUNT: 14.5 % (ref 11.5–14.5)
EST. GFR  (NO RACE VARIABLE): >60 ML/MIN/1.73 M^2
GLUCOSE SERPL-MCNC: 108 MG/DL (ref 70–110)
HCT VFR BLD AUTO: 40.2 % (ref 40–54)
HCV AB SERPL QL IA: NORMAL
HGB BLD-MCNC: 12.5 G/DL (ref 14–18)
HIV 1+2 AB+HIV1 P24 AG SERPL QL IA: NORMAL
IMM GRANULOCYTES # BLD AUTO: 0.03 K/UL (ref 0–0.04)
IMM GRANULOCYTES NFR BLD AUTO: 0.3 % (ref 0–0.5)
LYMPHOCYTES # BLD AUTO: 1.4 K/UL (ref 1–4.8)
LYMPHOCYTES NFR BLD: 14.5 % (ref 18–48)
MCH RBC QN AUTO: 24.9 PG (ref 27–31)
MCHC RBC AUTO-ENTMCNC: 31.1 G/DL (ref 32–36)
MCV RBC AUTO: 80 FL (ref 82–98)
MONOCYTES # BLD AUTO: 0.4 K/UL (ref 0.3–1)
MONOCYTES NFR BLD: 4.1 % (ref 4–15)
NEUTROPHILS # BLD AUTO: 7.5 K/UL (ref 1.8–7.7)
NEUTROPHILS NFR BLD: 80.9 % (ref 38–73)
NRBC BLD-RTO: 0 /100 WBC
PLATELET # BLD AUTO: 288 K/UL (ref 150–450)
PMV BLD AUTO: 10.3 FL (ref 9.2–12.9)
POTASSIUM SERPL-SCNC: 4.1 MMOL/L (ref 3.5–5.1)
RBC # BLD AUTO: 5.02 M/UL (ref 4.6–6.2)
SODIUM SERPL-SCNC: 139 MMOL/L (ref 136–145)
WBC # BLD AUTO: 9.28 K/UL (ref 3.9–12.7)

## 2023-06-06 PROCEDURE — 25000242 PHARM REV CODE 250 ALT 637 W/ HCPCS: Performed by: PHYSICIAN ASSISTANT

## 2023-06-06 PROCEDURE — 99236 HOSP IP/OBS SAME DATE HI 85: CPT | Mod: ,,, | Performed by: FAMILY MEDICINE

## 2023-06-06 PROCEDURE — 80048 BASIC METABOLIC PNL TOTAL CA: CPT | Performed by: PHYSICIAN ASSISTANT

## 2023-06-06 PROCEDURE — 99236 PR OBSERV/HOSP SAME DATE,LEVL V: ICD-10-PCS | Mod: ,,, | Performed by: FAMILY MEDICINE

## 2023-06-06 PROCEDURE — 99283 PR EMERGENCY DEPT VISIT,LEVEL III: ICD-10-PCS | Mod: 25,,, | Performed by: OTOLARYNGOLOGY

## 2023-06-06 PROCEDURE — 31575 DIAGNOSTIC LARYNGOSCOPY: CPT | Mod: ,,, | Performed by: OTOLARYNGOLOGY

## 2023-06-06 PROCEDURE — 85025 COMPLETE CBC W/AUTO DIFF WBC: CPT | Performed by: PHYSICIAN ASSISTANT

## 2023-06-06 PROCEDURE — 25000003 PHARM REV CODE 250: Performed by: PHYSICIAN ASSISTANT

## 2023-06-06 PROCEDURE — 63600175 PHARM REV CODE 636 W HCPCS: Performed by: FAMILY MEDICINE

## 2023-06-06 PROCEDURE — 31575 PR LARYNGOSCOPY, FLEXIBLE; DIAGNOSTIC: ICD-10-PCS | Mod: ,,, | Performed by: OTOLARYNGOLOGY

## 2023-06-06 PROCEDURE — 94640 AIRWAY INHALATION TREATMENT: CPT | Mod: XB

## 2023-06-06 PROCEDURE — 63600175 PHARM REV CODE 636 W HCPCS: Performed by: PHYSICIAN ASSISTANT

## 2023-06-06 PROCEDURE — 99283 EMERGENCY DEPT VISIT LOW MDM: CPT | Mod: 25,,, | Performed by: OTOLARYNGOLOGY

## 2023-06-06 PROCEDURE — 86803 HEPATITIS C AB TEST: CPT | Performed by: PHYSICIAN ASSISTANT

## 2023-06-06 PROCEDURE — G0378 HOSPITAL OBSERVATION PER HR: HCPCS

## 2023-06-06 PROCEDURE — 96376 TX/PRO/DX INJ SAME DRUG ADON: CPT

## 2023-06-06 PROCEDURE — 94761 N-INVAS EAR/PLS OXIMETRY MLT: CPT

## 2023-06-06 PROCEDURE — 87389 HIV-1 AG W/HIV-1&-2 AB AG IA: CPT | Performed by: PHYSICIAN ASSISTANT

## 2023-06-06 RX ORDER — METOPROLOL SUCCINATE 25 MG/1
25 TABLET, EXTENDED RELEASE ORAL DAILY
Qty: 90 TABLET | Refills: 3
Start: 2023-06-06

## 2023-06-06 RX ORDER — IPRATROPIUM BROMIDE AND ALBUTEROL SULFATE 2.5; .5 MG/3ML; MG/3ML
3 SOLUTION RESPIRATORY (INHALATION) EVERY 4 HOURS PRN
Qty: 90 ML | Refills: 0 | Status: SHIPPED | OUTPATIENT
Start: 2023-06-06 | End: 2024-01-10 | Stop reason: SDUPTHER

## 2023-06-06 RX ORDER — BENZONATATE 100 MG/1
100 CAPSULE ORAL EVERY 8 HOURS PRN
Qty: 15 CAPSULE | Refills: 0 | Status: SHIPPED | OUTPATIENT
Start: 2023-06-06 | End: 2023-06-11

## 2023-06-06 RX ORDER — FLUTICASONE PROPIONATE 50 MCG
1 SPRAY, SUSPENSION (ML) NASAL DAILY PRN
Start: 2023-06-06 | End: 2024-06-05

## 2023-06-06 RX ORDER — AZELASTINE 1 MG/ML
1 SPRAY, METERED NASAL 2 TIMES DAILY PRN
Start: 2023-06-06 | End: 2024-06-05

## 2023-06-06 RX ORDER — PREDNISONE 20 MG/1
40 TABLET ORAL DAILY
Qty: 10 TABLET | Refills: 0 | Status: SHIPPED | OUTPATIENT
Start: 2023-06-06 | End: 2023-06-11

## 2023-06-06 RX ORDER — DEXTROMETHORPHAN HBR AND GUAIFENESIN 5; 100 MG/5ML; MG/5ML
5 LIQUID ORAL EVERY 6 HOURS PRN
Qty: 118 ML | Refills: 0 | Status: SHIPPED | OUTPATIENT
Start: 2023-06-06 | End: 2023-06-11

## 2023-06-06 RX ORDER — METHYLPREDNISOLONE SOD SUCC 125 MG
40 VIAL (EA) INJECTION ONCE
Status: DISCONTINUED | OUTPATIENT
Start: 2023-06-06 | End: 2023-06-06

## 2023-06-06 RX ORDER — METHYLPREDNISOLONE SOD SUCC 125 MG
40 VIAL (EA) INJECTION ONCE
Status: COMPLETED | OUTPATIENT
Start: 2023-06-06 | End: 2023-06-06

## 2023-06-06 RX ADMIN — METHYLPREDNISOLONE SODIUM SUCCINATE 40 MG: 125 INJECTION, POWDER, FOR SOLUTION INTRAMUSCULAR; INTRAVENOUS at 11:06

## 2023-06-06 RX ADMIN — IPRATROPIUM BROMIDE AND ALBUTEROL SULFATE 3 ML: .5; 3 SOLUTION RESPIRATORY (INHALATION) at 08:06

## 2023-06-06 RX ADMIN — SACUBITRIL AND VALSARTAN 1 TABLET: 24; 26 TABLET, FILM COATED ORAL at 10:06

## 2023-06-06 RX ADMIN — PREDNISONE 40 MG: 20 TABLET ORAL at 08:06

## 2023-06-06 RX ADMIN — IPRATROPIUM BROMIDE AND ALBUTEROL SULFATE 3 ML: .5; 3 SOLUTION RESPIRATORY (INHALATION) at 11:06

## 2023-06-06 RX ADMIN — METOPROLOL SUCCINATE 25 MG: 25 TABLET, EXTENDED RELEASE ORAL at 08:06

## 2023-06-06 RX ADMIN — IPRATROPIUM BROMIDE AND ALBUTEROL SULFATE 3 ML: .5; 3 SOLUTION RESPIRATORY (INHALATION) at 04:06

## 2023-06-06 RX ADMIN — TICAGRELOR 90 MG: 90 TABLET ORAL at 08:06

## 2023-06-06 NOTE — DISCHARGE INSTRUCTIONS
Seek medical attention for worsening shortness of breath especially on exertion, worsening mouth ulcers or developing other symptoms such as chest pain, palpitations, abdominal pain, nausea/vomiting, fevers/chills.     Avoid tobacco products, alcohol, illicit substances.    Recommend holding metoprolol until you complete steroids.  Metoprolol may contribute to bronchospasm which could worsen your condition.  Resume metoprolol once he finished steroids.  Entresto could trigger cough in some patients.  Discussed both metoprolol and Entresto with Cardiology.    The ulcers in your mouth may be due to the Advair.  Discuss further with pulmonology/PCP to ensure correct inhalation technique.  Gargle or rinse out her mouth with water following each use.  I have attached information regarding inhaled corticosteroid medicines.  Prescription has been written for magic mouthwash for pain that may be caused by the ulcers.    Recommend using albuterol inhaler before you go to work in a closed space that could trigger allergies.    Complete course of steroids as prescribed.  Recommend avoiding work in closed spaces that could trigger allergies until completion.    I have placed a referral to follow up outpatient with an allergist.    I have sent to prescriptions for cough, Tessalon and Mucinex DM.  Recommend using whichever 1 seems to work best.    Follow-up with PCP within 1-2 weeks. It is important to note that each of these medications that I have prescribed typically will not have refills. This is so your primary care physician or other specialists in the outpatient setting can review your progress and determine if these medications are to be continued. If they determine the medications need to be continued, make sure that your remind them about refills at your follow-up appointments.    Follow up with pulmonologist's at next available appointment.    Referral sent to allergist.  Follow up at next available appointment.

## 2023-06-06 NOTE — CONSULTS
[facility] Emergency Dept  Otorhinolaryngology-Head & Neck Surgery  Consult Note    Patient Name: Gordon Dewitt  MRN: [id number]  Code Status: Full Code  Admission Date: 6/5/2023  Hospital Length of Stay: 0 days  Attending Physician: Jann Sarabia III,*  Primary Care Provider: Primary Doctor No    Patient information was obtained from patient, past medical records and primary team.     Inpatient consult to ENT  Consult performed by: Rafael Mackay MD  Consult ordered by: Jann Sarabia III, MD        Subjective:     Chief Complaint/Reason for Admission: wheezing, chronic cough    History of Present Illness: Mr. Dewitt is a 63 year-old male with a history of STEMI in May 2022 s/p drug-eluting stents, who presented to [facility] ED on 6/5 for respiratory distress. Patient recalls ongoing history of wheezing/coughing/shortness of breath that started after his STEMI. It occurs every two months or so. He has had pulmonology workup including CTs and PFTs with no abnormalities. He denies any noisy breathing or stridor. He is tolerating regular diet. He also reports recurrent oral ulcers with the last episode starting a week ago. Of note, he is currently on Entresto (sacubitril/valsartan) that was started after his STEMI.    During this admission, patient received 165 mg methylprednisolone IV and 40 mg prednisone PO.       Medications:  Continuous Infusions:  Scheduled Meds:   albuterol-ipratropium  3 mL Nebulization Q4H    sacubitriL-valsartan  1 tablet Oral BID    ticagrelor  90 mg Oral BID     PRN Meds:(Magic mouthwash) 1:1:1 diphenhydrAMINE(Benadryl) 12.5mg/5ml liq, aluminum & magnesium hydroxide-simethicone (Maalox), LIDOcaine viscous 2%, benzonatate, melatonin, sodium chloride 0.9%     No current facility-administered medications on file prior to encounter.     Current Outpatient Medications on File Prior to Encounter   Medication Sig    albuterol (PROVENTIL/VENTOLIN HFA) 90 mcg/actuation inhaler 2  puffs every 4 (four) hours as needed.    aspirin 81 MG Chew Take 81 mg by mouth once daily.    atorvastatin (LIPITOR) 40 MG tablet Take 1 tablet (40 mg total) by mouth every evening.    azelastine (ASTELIN) 137 mcg (0.1 %) nasal spray 1 spray (137 mcg total) by Nasal route 2 (two) times daily. (Patient taking differently: 1 spray by Nasal route 2 (two) times daily as needed for Rhinitis.)    benzocaine/menthol/zinc chlor (ORAJEL 3X MOUTH SORES MM) by Mucous Membrane route. Apply topically to mouth blisters daily.    BRILINTA 90 mg tablet Take 1 tablet (90 mg total) by mouth 2 (two) times daily.    dapagliflozin (FARXIGA) 10 mg tablet Take 1 tablet (10 mg total) by mouth once daily at 6am.    ENTRESTO 24-26 mg per tablet Take 1 tablet by mouth 2 (two) times daily.    fluticasone propionate (FLONASE) 50 mcg/actuation nasal spray 1 spray (50 mcg total) by Each Nostril route once daily. (Patient taking differently: 1 spray by Each Nostril route daily as needed for Rhinitis.)    fluticasone-salmeterol diskus inhaler 250-50 mcg Inhale 1 puff into the lungs 2 (two) times daily. Controller    ibuprofen (ADVIL,MOTRIN) 200 MG tablet Take 400 mg by mouth daily as needed for Pain.    levocetirizine (XYZAL) 5 MG tablet Take 1 tablet (5 mg total) by mouth every evening.    metoprolol succinate (TOPROL-XL) 25 MG 24 hr tablet Take 1 tablet (25 mg total) by mouth once daily.    montelukast (SINGULAIR) 10 mg tablet Take 10 mg by mouth once daily.    omega-3 fatty acids/fish oil (FISH OIL-OMEGA-3 FATTY ACIDS) 300-1,000 mg capsule Take 1 capsule by mouth once a week.    nitroGLYCERIN (NITROSTAT) 0.4 MG SL tablet Place 0.4 mg under the tongue.    predniSONE (DELTASONE) 20 MG tablet Take 20 mg by mouth 2 (two) times daily.     Review of patient's allergies indicates:  No Known Allergies    Past Medical History:   Diagnosis Date    CAD (coronary artery disease)     CHF (congestive heart failure), NYHA class I      Hyperlipidemia      No past surgical history on file.  Family History       Problem Relation (Age of Onset)    Diabetes Father, Brother    Heart disease Brother          Tobacco Use    Smoking status: Never     Passive exposure: Never    Smokeless tobacco: Never   Substance and Sexual Activity    Alcohol use: Never    Drug use: Never    Sexual activity: Yes     Partners: Female     Birth control/protection: None     Comment: Partner post menopausal     Review of Systems  Objective:     Vital Signs (Most Recent):  Temp: 97.9 °F (36.6 °C) (06/06/23 0755)  Pulse: 79 (06/06/23 1150)  Resp: 18 (06/06/23 1150)  BP: 114/73 (06/06/23 0755)  SpO2: 95 % (06/06/23 1150) Vital Signs (24h Range):  Temp:  [97.7 °F (36.5 °C)-99 °F (37.2 °C)] 97.9 °F (36.6 °C)  Pulse:  [70-86] 79  Resp:  [16-22] 18  SpO2:  [88 %-97 %] 95 %  BP: ()/(54-73) 114/73     Weight: 68 kg (150 lb)  Body mass index is 25.73 kg/m².     Physical Exam  Resting comfortably on hospital bed   Voice is strong, no noisy breathing, no audible wheezing, occasional coughing  Small papilloma along anterior/dorsal tongue, large ulcer along left lateral tongue  Oropharynx with moist mucous membrane    Flexible Laryngoscopy     Nasal Cavity/Nasopharynx: Normal mucosa, inferior turbinates and septum. No evidence of septal deviation or perforation. There are no visible lesions. Skylar within normal limits.  Oropharynx: Pharyngeal wall without edema, lesions, or masses. Bilateral palatine tonsils within normal limits. Base of tongue symmetric without masses or lesions. Lingual tonsil within normal limits.  Hypopharynx: No lesions or masses noted within the piriform sinuses or post cricoid area. No pooling of secretions.  Supraglottis: There is no edema of the arytenoids, interarytenoid space or epiglottis. Epiglottis is crisp. There are no masses or lesions noted. False vocal folds without masses or lesions.  Glottis: True vocal folds without masses or lesions.  Normal mobility and airway patency.    Significant Labs:  BMP:   Recent Labs   Lab 06/06/23  0331         CO2 21*   BUN 15   CREATININE 0.7   CALCIUM 8.8     CBC:   Recent Labs   Lab 06/06/23  0331   WBC 9.28   RBC 5.02   HGB 12.5*   HCT 40.2      MCV 80*   MCH 24.9*   MCHC 31.1*     Significant Diagnostics:  I have reviewed all pertinent imaging results/findings within the past 24 hours.    Assessment/Plan:     Chronic cough  Mr. Dewitt is a 63 year-old male with a history of STEMI in May 2022 s/p MISSAEL. Currently on Entresto (sacubitril/valsartan). He reports ongoing intermittent cough/wheezing episodes that occur every 2 months or so requiring steroids. No recent medication changes. Tolerating oral diet with symptoms of aspiration. Upon a brief literature review, it seems Entresto may have a similar angioedema profile to enalapril. Although the patient's flexible laryngoscopy is normal today, I cannot rule out angioedema or chronic cough at initial hospital presentation due to the use of intravenous and oral steroids. I discussed with the patient possibly considering switching from Entresto to another medication that is not of the ACE or neprilysin inhibitor family.     -- No acute interventions from ENT  -- Recommend discussion with cardiology/PCP regarding stopping Entresto to see if it alleviates symptoms; risks/benefits needs to be discussed regarding stopping this medication  -- Can consider Magic mouthwash for oral ulcers  -- Please Secure Chat me for any questions, page ENT on-call if unresponsive or urgent      VTE Risk Mitigation (From admission, onward)         Ordered     IP VTE HIGH RISK PATIENT  Once         06/05/23 2321     Place sequential compression device  Until discontinued         06/05/23 2321                Thank you for your consult. I will sign off. Please contact us if you have any additional questions.    Rafael Mackay MD  Otorhinolaryngology-Head & Neck  Surgery  [facility] Emergency Dept

## 2023-06-06 NOTE — PLAN OF CARE
06/05/23 1913   Post-Acute Status   Post-Acute Authorization Other   Other Status No Post-Acute Service Needs   Discharge Delays None known at this time   Discharge Plan   Discharge Plan A Home with family   Discharge Plan B Home with family;Home         Sw met with pt at bedside to complete pt's discharge assessment. Pt stated he  independently ambulates. Pt stated his wife resides in [place] ,but pt's son is home for school. Pt has no acute needs at this time.        Richard Bustamante LMSW  Case Management  Emergency Department  [phone]

## 2023-06-06 NOTE — HPI
Mr. Dewitt is a 63 year-old male with a history of STEMI in May 2022 s/p drug-eluting stents, who presented to [facility] ED on 6/5 for respiratory distress. Patient recalls ongoing history of wheezing/coughing/shortness of breath that started after his STEMI. It occurs every two months or so. He has had pulmonology workup including CTs and PFTs with no abnormalities. He denies any noisy breathing or stridor. He is tolerating regular diet. He also reports recurrent oral ulcers with the last episode starting a week ago. Of note, he is currently on Entresto (sacubitril/valsartan) that was started after his STEMI.    During this admission, patient received 165 mg methylprednisolone IV and 40 mg prednisone PO.

## 2023-06-06 NOTE — ED NOTES
LOC: The patient is awake, alert and aware of environment with an appropriate affect, the patient is oriented x 3 and speaking appropriately.  APPEARANCE: Patient resting comfortably and in no acute distress, patient is clean and well groomed.  SKIN: The skin is warm and dry, color consistent with ethnicity, patient has normal skin turgor and mouth ulcers, skin intact  MUSKULOSKELETAL: Patient moving all extremities well, no obvious swelling or deformities noted.  RESPIRATORY: Airway is open and patent, respirations are spontaneous, patient has a normal effort and rate, no accessory muscle use noted. Patient reports shortness of breath  CARDIAC: Patient has a normal rate and rhythm. Pt denies chest pain  NEUROLOGIC: Eyes open spontaneously, behavior appropriate to situation, follows commands,

## 2023-06-06 NOTE — ED NOTES
Resting sat pre-walk 93% room air    Patient stand by assist ambulatory with cont pulse ox for 60 secs around EDOU. Sat post walk = 92%    Back in bed post walk. At rest 93%

## 2023-06-06 NOTE — ED NOTES
97% O2 sat at rest. HR = 92.     Patient ambulated with pulse ox accompanied by ED Johnathan Palomo.     Ambulatory sat = 88% ra. Patient does report sob with ambulation     Xi hernández notified

## 2023-06-06 NOTE — PLAN OF CARE
[facility] Emergency Dept  Initial Discharge Assessment       Primary Care Provider: Primary Doctor No    Admission Diagnosis: Acute bronchitis, unspecified organism [J20.9]    Admission Date: 6/5/2023  Expected Discharge Date: unknown   Sw met with pt at bedside to complete pt's discharge assessment. Pt stated he  independently ambulates. Pt stated his wife resides in [place] ,but pt's son is home for school. Pt has no acute needs at this time.      Richard Bustamante LMSW  Case Management  Emergency Department  [phone]     Transition of Care Barriers: (P) None    Payor: [facility] / Plan: [place] PPO / Product Type: PPO /     Extended Emergency Contact Information  Primary Emergency Contact: Aracely Gleason  Mobile Phone: [phone]  Relation: Spouse    Discharge Plan A: (P) Home with family  Discharge Plan B: (P) Home with family, Home      [facility] [place] - [address]  [address]  [place] [address]  Phone: [phone] Fax: [phone]      Initial Assessment (most recent)       Adult Discharge Assessment - 06/05/23 1916          Discharge Assessment    Assessment Type Discharge Planning Assessment (P)      Confirmed/corrected address, phone number and insurance Yes (P)      Confirmed Demographics Correct on Facesheet (P)      Source of Information patient (P)      Does patient/caregiver understand observation status Yes (P)      Communicated MESHA with patient/caregiver Date not available/Unable to determine (P)      People in Home child(kenyatta), adult (P)      Do you expect to return to your current living situation? Yes (P)      Do you have help at home or someone to help you manage your care at home? Yes (P)      Who are your caregiver(s) and their phone number(s)? George Dewitt (P)      Prior to hospitilization cognitive status: Alert/Oriented (P)      Current cognitive status: Alert/Oriented (P)      Walking or Climbing Stairs -- (P)     none reported    Dressing/Bathing -- (P)    none reported    Home Accessibility wheelchair accessible (P)      Home Layout Able to live on 1st floor (P)      Equipment Currently Used at Home none (P)      Readmission within 30 days? No (P)      Patient currently being followed by outpatient case management? No (P)      Do you currently have service(s) that help you manage your care at home? No (P)      Do you take prescription medications? Yes (P)      Do you have prescription coverage? Yes (P)      Do you have any problems affording any of your prescribed medications? No (P)      Is the patient taking medications as prescribed? yes (P)      How do you get to doctors appointments? car, drives self (P)      Are you on dialysis? No (P)      Do you take coumadin? No (P)      Discharge Plan A Home with family (P)      Discharge Plan B Home with family;Home (P)      DME Needed Upon Discharge  none (P)      Discharge Plan discussed with: Patient (P)      Transition of Care Barriers None (P)         Physical Activity    On average, how many days per week do you engage in moderate to strenuous exercise (like a brisk walk)? 6 days (P)      On average, how many minutes do you engage in exercise at this level? 60 min (P)         Financial Resource Strain    How hard is it for you to pay for the very basics like food, housing, medical care, and heating? Not hard at all (P)         Housing Stability    In the last 12 months, was there a time when you were not able to pay the mortgage or rent on time? No (P)      In the last 12 months, how many places have you lived? 1 (P)         Transportation Needs    In the past 12 months, has lack of transportation kept you from medical appointments or from getting medications? No (P)      In the past 12 months, has lack of transportation kept you from meetings, work, or from getting things needed for daily living? No (P)         Food Insecurity    Within the past 12 months, you worried that  your food would run out before you got the money to buy more. Never true (P)      Within the past 12 months, the food you bought just didn't last and you didn't have money to get more. Never true (P)         Stress    Do you feel stress - tense, restless, nervous, or anxious, or unable to sleep at night because your mind is troubled all the time - these days? Not at all (P)         Social Connections    How often do you get together with friends or relatives? More than three times a week (P)      How often do you attend Taoism or Anabaptism services? More than 4 times per year (P)      Do you belong to any clubs or organizations such as Taoism groups, unions, fraternal or athletic groups, or school groups? No (P)      How often do you attend meetings of the clubs or organizations you belong to? Never (P)      Are you , , , , never , or living with a partner?  (P)         Alcohol Use    Q1: How often do you have a drink containing alcohol? Never (P)      Q2: How many drinks containing alcohol do you have on a typical day when you are drinking? Patient does not drink (P)      Q3: How often do you have six or more drinks on one occasion? Never (P)

## 2023-06-06 NOTE — SUBJECTIVE & OBJECTIVE
Medications:  Continuous Infusions:  Scheduled Meds:   albuterol-ipratropium  3 mL Nebulization Q4H    sacubitriL-valsartan  1 tablet Oral BID    ticagrelor  90 mg Oral BID     PRN Meds:(Magic mouthwash) 1:1:1 diphenhydrAMINE(Benadryl) 12.5mg/5ml liq, aluminum & magnesium hydroxide-simethicone (Maalox), LIDOcaine viscous 2%, benzonatate, melatonin, sodium chloride 0.9%     No current facility-administered medications on file prior to encounter.     Current Outpatient Medications on File Prior to Encounter   Medication Sig    albuterol (PROVENTIL/VENTOLIN HFA) 90 mcg/actuation inhaler 2 puffs every 4 (four) hours as needed.    aspirin 81 MG Chew Take 81 mg by mouth once daily.    atorvastatin (LIPITOR) 40 MG tablet Take 1 tablet (40 mg total) by mouth every evening.    azelastine (ASTELIN) 137 mcg (0.1 %) nasal spray 1 spray (137 mcg total) by Nasal route 2 (two) times daily. (Patient taking differently: 1 spray by Nasal route 2 (two) times daily as needed for Rhinitis.)    benzocaine/menthol/zinc chlor (ORAJEL 3X MOUTH SORES MM) by Mucous Membrane route. Apply topically to mouth blisters daily.    BRILINTA 90 mg tablet Take 1 tablet (90 mg total) by mouth 2 (two) times daily.    dapagliflozin (FARXIGA) 10 mg tablet Take 1 tablet (10 mg total) by mouth once daily at 6am.    ENTRESTO 24-26 mg per tablet Take 1 tablet by mouth 2 (two) times daily.    fluticasone propionate (FLONASE) 50 mcg/actuation nasal spray 1 spray (50 mcg total) by Each Nostril route once daily. (Patient taking differently: 1 spray by Each Nostril route daily as needed for Rhinitis.)    fluticasone-salmeterol diskus inhaler 250-50 mcg Inhale 1 puff into the lungs 2 (two) times daily. Controller    ibuprofen (ADVIL,MOTRIN) 200 MG tablet Take 400 mg by mouth daily as needed for Pain.    levocetirizine (XYZAL) 5 MG tablet Take 1 tablet (5 mg total) by mouth every evening.    metoprolol succinate (TOPROL-XL) 25 MG 24 hr tablet Take 1 tablet (25 mg  total) by mouth once daily.    montelukast (SINGULAIR) 10 mg tablet Take 10 mg by mouth once daily.    omega-3 fatty acids/fish oil (FISH OIL-OMEGA-3 FATTY ACIDS) 300-1,000 mg capsule Take 1 capsule by mouth once a week.    nitroGLYCERIN (NITROSTAT) 0.4 MG SL tablet Place 0.4 mg under the tongue.    predniSONE (DELTASONE) 20 MG tablet Take 20 mg by mouth 2 (two) times daily.     Review of patient's allergies indicates:  No Known Allergies    Past Medical History:   Diagnosis Date    CAD (coronary artery disease)     CHF (congestive heart failure), NYHA class I     Hyperlipidemia      No past surgical history on file.  Family History       Problem Relation (Age of Onset)    Diabetes Father, Brother    Heart disease Brother          Tobacco Use    Smoking status: Never     Passive exposure: Never    Smokeless tobacco: Never   Substance and Sexual Activity    Alcohol use: Never    Drug use: Never    Sexual activity: Yes     Partners: Female     Birth control/protection: None     Comment: Partner post menopausal     Review of Systems  Objective:     Vital Signs (Most Recent):  Temp: 97.9 °F (36.6 °C) (06/06/23 0755)  Pulse: 79 (06/06/23 1150)  Resp: 18 (06/06/23 1150)  BP: 114/73 (06/06/23 0755)  SpO2: 95 % (06/06/23 1150) Vital Signs (24h Range):  Temp:  [97.7 °F (36.5 °C)-99 °F (37.2 °C)] 97.9 °F (36.6 °C)  Pulse:  [70-86] 79  Resp:  [16-22] 18  SpO2:  [88 %-97 %] 95 %  BP: ()/(54-73) 114/73     Weight: 68 kg (150 lb)  Body mass index is 25.73 kg/m².     Physical Exam  Resting comfortably on hospital bed   Voice is strong, no noisy breathing, no audible wheezing, occasional coughing  Small papilloma along anterior/dorsal tongue, large ulcer along left lateral tongue  Oropharynx with moist mucous membrane    Flexible Laryngoscopy     Nasal Cavity/Nasopharynx: Normal mucosa, inferior turbinates and septum. No evidence of septal deviation or perforation. There are no visible lesions. Skylar within normal  limits.  Oropharynx: Pharyngeal wall without edema, lesions, or masses. Bilateral palatine tonsils within normal limits. Base of tongue symmetric without masses or lesions. Lingual tonsil within normal limits.  Hypopharynx: No lesions or masses noted within the piriform sinuses or post cricoid area. No pooling of secretions.  Supraglottis: There is no edema of the arytenoids, interarytenoid space or epiglottis. Epiglottis is crisp. There are no masses or lesions noted. False vocal folds without masses or lesions.  Glottis: True vocal folds without masses or lesions. Normal mobility and airway patency.    Significant Labs:  BMP:   Recent Labs   Lab 06/06/23  0331         CO2 21*   BUN 15   CREATININE 0.7   CALCIUM 8.8     CBC:   Recent Labs   Lab 06/06/23  0331   WBC 9.28   RBC 5.02   HGB 12.5*   HCT 40.2      MCV 80*   MCH 24.9*   MCHC 31.1*     Significant Diagnostics:  I have reviewed all pertinent imaging results/findings within the past 24 hours.

## 2023-06-06 NOTE — PROGRESS NOTES
ED Observation Unit  Progress Note      HPI   63-year-old male with a history of CHF with an EF of 30%, CAD, HLD, who presents to [facility] on 6/5/2023 for emergent evaluation of cough, SOB, and chest pain associated with a sore throat.     Patient states that sore throat is associated with the development of ulcerative lesions in the back of his throat.  In addition he states that his shortness of breath has worsened to the point that he can barely walk across a room without having to stop.  He has had a new cough for the past several months which has been worked up in the outpatient setting including PFTs which were normal and a chest x-ray which was unremarkable.     In the ED, VSS, ECG with NSR at 76 bpm. R glynn axis. Nonspecific T wave inversion in lead III. Normal intervals. No STEMI. No leukocytosis, left shift, electrolyte abn, GIRMA, liver dysfunction, or signs of ACS or CHF. Patient given breathing treatment and solumedrol 125 mg IV.    Interval History   Patient reports breathing has improved.  He does have persistent pain from the blisters in his mouth throat, offered magic mouthwash but he declines.    PMHx   Past Medical History:   Diagnosis Date    CAD (coronary artery disease)     CHF (congestive heart failure), NYHA class I     Hyperlipidemia       No past surgical history on file.     Family Hx   Family History   Problem Relation Age of Onset    Diabetes Father     Diabetes Brother     Heart disease Brother         Social Hx   Social History     Socioeconomic History    Marital status:    Tobacco Use    Smoking status: Never     Passive exposure: Never    Smokeless tobacco: Never   Substance and Sexual Activity    Alcohol use: Never    Drug use: Never    Sexual activity: Yes     Partners: Female     Birth control/protection: None     Comment: Partner post menopausal     Social Determinants of Health     Financial Resource Strain: Low Risk     Difficulty of Paying Living Expenses: Not hard at all    Food Insecurity: No Food Insecurity    Worried About Running Out of Food in the Last Year: Never true    Ran Out of Food in the Last Year: Never true   Transportation Needs: No Transportation Needs    Lack of Transportation (Medical): No    Lack of Transportation (Non-Medical): No   Physical Activity: Sufficiently Active    Days of Exercise per Week: 6 days    Minutes of Exercise per Session: 60 min   Stress: No Stress Concern Present    Feeling of Stress : Not at all   Social Connections: Moderately Integrated    Frequency of Social Gatherings with Friends and Family: More than three times a week    Attends Mandaen Services: More than 4 times per year    Active Member of Clubs or Organizations: No    Attends Club or Organization Meetings: Never    Marital Status:    Housing Stability: Unknown    Unable to Pay for Housing in the Last Year: No    Number of Places Lived in the Last Year: 1        Vital Signs   Vitals:    06/06/23 0332 06/06/23 0422 06/06/23 0604 06/06/23 0755   BP: (!) 118/59  121/71 114/73   BP Location: Left arm  Left arm Left arm   Patient Position: Lying  Lying Lying   Pulse: 70 75 81 77   Resp: 20 19 18 20   Temp: 97.9 °F (36.6 °C)  97.9 °F (36.6 °C) 97.9 °F (36.6 °C)   TempSrc: Oral  Oral Oral   SpO2: 95% 95% 95% 96%   Weight:       Height:            Review of Systems  Review of Systems   Constitutional:  Negative for fever.   HENT:  Positive for sore throat.    Respiratory:  Positive for cough. Negative for shortness of breath.    Cardiovascular:  Negative for chest pain.     Brief Physical Exam/Reassessment   Physical Exam  Vitals and nursing note reviewed. Exam conducted with a chaperone present.   Constitutional:       Appearance: Normal appearance.   HENT:      Head: Normocephalic and atraumatic.      Mouth/Throat:      Comments: Multiple pustular intraoral lesions on tongue, gingiva  Cardiovascular:      Rate and Rhythm: Normal rate and regular rhythm.   Pulmonary:      Effort:  Pulmonary effort is normal. No respiratory distress.      Breath sounds: No stridor. Wheezing present. No rhonchi or rales.   Chest:      Chest wall: No tenderness.   Neurological:      Mental Status: He is alert.       Labs/Imaging   Labs Reviewed   CBC W/ AUTO DIFFERENTIAL - Abnormal; Notable for the following components:       Result Value    MCV 80 (*)     MCH 25.2 (*)     MCHC 31.7 (*)     All other components within normal limits   CBC W/ AUTO DIFFERENTIAL - Abnormal; Notable for the following components:    Hemoglobin 12.5 (*)     MCV 80 (*)     MCH 24.9 (*)     MCHC 31.1 (*)     Gran % 80.9 (*)     Lymph % 14.5 (*)     All other components within normal limits    Narrative:     Release to patient->Immediate   BASIC METABOLIC PANEL - Abnormal; Notable for the following components:    CO2 21 (*)     All other components within normal limits    Narrative:     Release to patient->Immediate   ISTAT PROCEDURE - Abnormal; Notable for the following components:    POC PO2 23 (*)     POC HCO3 23.1 (*)     POC SATURATED O2 39 (*)     All other components within normal limits   COMPREHENSIVE METABOLIC PANEL   TROPONIN I   B-TYPE NATRIURETIC PEPTIDE   SARS-COV-2 RNA AMPLIFICATION, QUAL   TROPONIN ISTAT   HIV 1 / 2 ANTIBODY    Narrative:     Release to patient->Immediate   HEPATITIS C ANTIBODY    Narrative:     Release to patient->Immediate   RESPIRATORY PATHOGENS PANEL (TEM-PCR)   POCT TROPONIN      Imaging Results              CTA Chest Non-Coronary (PE Studies) (Final result)  Result time 06/05/23 22:13:21      Final result by Nimco Clarke MD (06/05/23 22:13:21)                   Impression:      No evidence of pulmonary thromboemboli on this limited exam.    No RV strain.    Lungs are clear.      Electronically signed by: Nimco Clarke  Date:    06/05/2023  Time:    22:13               Narrative:    EXAMINATION:  CTA CHEST NON CORONARY (PE STUDIES)    CLINICAL HISTORY:  Pulmonary embolism (PE) suspected, high  prob;    TECHNIQUE:  Low dose axial images, sagittal and coronal reformations were obtained from the thoracic inlet to the lung bases following the IV administration of 75 mL of Omnipaque 350.    COMPARISON:  None    FINDINGS:  Pulmonary vasculature: Limited contrast bolus.  No evidence of pulmonary thromboemboli to the level of the segmental arteries. Pulmonary arteries distribute normally.  There are four pulmonary veins.    Anastasia/Mediastinum: No pathologic eli enlargement.    Airways: Patent.    Lungs/Pleura: Clear lungs. No pleural effusion or thickening.    Aorta: Left-sided aortic arch.  No aneurysm and no significant atherosclerosis    Heart: No evidence of right ventricular strain.  Heart is normal in size.  No effusion.    Base of Neck: No significant abnormality.    Thoracic soft tissues: Unremarkable.    Esophagus: Unremarkable.    Upper Abdomen: No abnormality of the partially imaged upper abdomen.    Bones: No acute fracture. No suspicious lytic or sclerotic lesions.                                       X-Ray Chest AP Portable (Final result)  Result time 06/05/23 14:48:04      Final result by Russ Wilkes Jr., MD (06/05/23 14:48:04)                   Impression:      No acute cardiopulmonary disease      Electronically signed by: Russ Guillen Jr  Date:    06/05/2023  Time:    14:48               Narrative:    EXAMINATION:  XR CHEST AP PORTABLE    CLINICAL HISTORY:  Chest Pain;    TECHNIQUE:  Single frontal view of the chest was performed.    COMPARISON:  None    FINDINGS:  Cardiomediastinal silhouettewithin normal limits for age.    Lungs grossly clear within limitations of portable technique    Pleura, diaphragm, and thoracic cage grossly unremarkable.                                       I reviewed all labs, imaging, EKGs.     Plan   Nebs, will check ambulatory O2 saturation and reassess.     I have discussed this case with SALEEM Simmons.

## 2023-06-06 NOTE — ED NOTES
Patient is calling his son for a ride home. Instructed to  meds from [facility]. Avs print out given. CD of CTA given to pt.  Iv removed; intact

## 2023-06-06 NOTE — ASSESSMENT & PLAN NOTE
Mr. Dewitt is a 63 year-old male with a history of STEMI in May 2022 s/p MISSAEL. Currently on Entresto (sacubitril/valsartan). He reports ongoing intermittent cough/wheezing episodes that occur every 2 months or so requiring steroids. No recent medication changes. Tolerating oral diet with symptoms of aspiration. Upon a brief literature review, it seems Entresto may have a similar angioedema profile to enalapril. Although the patient's flexible laryngoscopy is normal today, I cannot rule out angioedema or chronic cough at initial hospital presentation due to the use of intravenous and oral steroids. I discussed with the patient possibly considering switching from Entresto to another medication that is not of the ACE or neprilysin inhibitor family.     -- No acute interventions from ENT  -- Recommend discussion with cardiology/PCP regarding stopping Entresto to see if it alleviates symptoms; risks/benefits needs to be discussed regarding stopping this medication  -- Can consider Magic mouthwash for oral ulcers  -- Please Secure Chat me for any questions, page ENT on-call if unresponsive or urgent

## 2023-06-07 ENCOUNTER — PATIENT MESSAGE (OUTPATIENT)
Dept: INTERNAL MEDICINE | Facility: CLINIC | Age: 63
End: 2023-06-07
Payer: COMMERCIAL

## 2023-06-07 NOTE — ASSESSMENT & PLAN NOTE
Cough due to bronchospasm  Acute viral bronchitis with bronchospasm  History of STEMI  HFrEF  Patient was administered an additional dose of prednisone followed by an additional dose of IV Solu-Medrol on morning of 6/6.  Patient received another bronchodilator treatment after which he was taken on an ambulatory saturation test during which his oxygen levels did not drop below 92% and patient did not qualify for home oxygen.  I was contacted by patient's pulmonologist, Dr. Chowdhury, and informed that patient underwent pulmonary function testing which was normal and etiology of recurrent bronchospasms unclear.  After discussion, ENT was consulted who performed flexible laryngoscopy with no acute or chronic findings.  Symptoms could be explained by bronchospasm that was triggered while working in extreme heat, in tight spaces, with multiple allergens.  An alternative etiology could be a viral bronchitis with bronchospasm as patient did describe a night where he was experiencing subjective fevers along with productive cough, sore throat, and shortness of breath.    Following ambulatory saturation test and flexible laryngoscopy, I discussed with patient's wife who is a pediatrician and discussed further with the patient.  As patient is not requiring oxygen, cough improved, and patient feels 90% better, it is reasonable to discharge the patient to continue treatment at home.  Prescriptions written for antitussives for cough, magic mouthwash for ulcers, and prednisone with refills for albuterol nebulizers for inflammation of the airways and bronchospasm.  I placed referral for outpatient allergist follow-up.  I also recommended that patient hold metoprolol until he completes course of steroids as metoprolol may be contributing to bronchospasm.  Patient could consider discussion with cardiologist regarding a trial of discontinuation of metoprolol and Entresto at different times to see if either of these medications are  contributing to bronchospasms.  Known adverse effect of metoprolol is bronchospasm and Entresto is chronic cough.  Finally, I recommended the patient avoid working in attics until this episode has totally resolved.  Patient may consider using albuterol prior to initiating work especially if in enclosed, hot, elizabeth spaces.

## 2023-06-07 NOTE — SUBJECTIVE & OBJECTIVE
Past Medical History:   Diagnosis Date    CAD (coronary artery disease)     CHF (congestive heart failure), NYHA class I     Hyperlipidemia     Myocardial infarction        No past surgical history on file.    Review of patient's allergies indicates:  No Known Allergies    No current facility-administered medications on file prior to encounter.     Current Outpatient Medications on File Prior to Encounter   Medication Sig    albuterol (PROVENTIL/VENTOLIN HFA) 90 mcg/actuation inhaler 2 puffs every 4 (four) hours as needed.    aspirin 81 MG Chew Take 81 mg by mouth once daily.    atorvastatin (LIPITOR) 40 MG tablet Take 1 tablet (40 mg total) by mouth every evening.    benzocaine/menthol/zinc chlor (ORAJEL 3X MOUTH SORES MM) by Mucous Membrane route. Apply topically to mouth blisters daily.    BRILINTA 90 mg tablet Take 1 tablet (90 mg total) by mouth 2 (two) times daily.    dapagliflozin (FARXIGA) 10 mg tablet Take 1 tablet (10 mg total) by mouth once daily at 6am.    ENTRESTO 24-26 mg per tablet Take 1 tablet by mouth 2 (two) times daily.    fluticasone-salmeterol diskus inhaler 250-50 mcg Inhale 1 puff into the lungs 2 (two) times daily. Controller    ibuprofen (ADVIL,MOTRIN) 200 MG tablet Take 400 mg by mouth daily as needed for Pain.    levocetirizine (XYZAL) 5 MG tablet Take 1 tablet (5 mg total) by mouth every evening.    montelukast (SINGULAIR) 10 mg tablet Take 10 mg by mouth once daily.    omega-3 fatty acids/fish oil (FISH OIL-OMEGA-3 FATTY ACIDS) 300-1,000 mg capsule Take 1 capsule by mouth once a week.    [DISCONTINUED] azelastine (ASTELIN) 137 mcg (0.1 %) nasal spray 1 spray (137 mcg total) by Nasal route 2 (two) times daily. (Patient taking differently: 1 spray by Nasal route 2 (two) times daily as needed for Rhinitis.)    [DISCONTINUED] fluticasone propionate (FLONASE) 50 mcg/actuation nasal spray 1 spray (50 mcg total) by Each Nostril route once daily. (Patient taking differently: 1 spray by Each  Nostril route daily as needed for Rhinitis.)    [DISCONTINUED] metoprolol succinate (TOPROL-XL) 25 MG 24 hr tablet Take 1 tablet (25 mg total) by mouth once daily.    nitroGLYCERIN (NITROSTAT) 0.4 MG SL tablet Place 0.4 mg under the tongue.    [DISCONTINUED] predniSONE (DELTASONE) 20 MG tablet Take 20 mg by mouth 2 (two) times daily.     Family History       Problem Relation (Age of Onset)    Diabetes Father, Brother    Heart disease Brother          Tobacco Use    Smoking status: Never     Passive exposure: Never    Smokeless tobacco: Never   Substance and Sexual Activity    Alcohol use: Never    Drug use: Never    Sexual activity: Yes     Partners: Female     Birth control/protection: None     Comment: Partner post menopausal     Review of Systems per HPI  Objective:     Vital Signs (Most Recent):  Temp: 98.8 °F (37.1 °C) (06/06/23 1428)  Pulse: 83 (06/06/23 1428)  Resp: 16 (06/06/23 1428)  BP: 104/63 (06/06/23 1428)  SpO2: (!) 93 % (06/06/23 1428) Vital Signs (24h Range):  Temp:  [97.7 °F (36.5 °C)-98.8 °F (37.1 °C)] 98.8 °F (37.1 °C)  Pulse:  [70-89] 83  Resp:  [16-22] 16  SpO2:  [88 %-97 %] 93 %  BP: ()/(55-73) 104/63     Weight: 68 kg (150 lb)  Body mass index is 25.73 kg/m².     Physical Exam  Vitals and nursing note reviewed.   Constitutional:       Appearance: He is well-developed.   HENT:      Mouth/Throat:      Comments: Aphthous ulcers noted on inside of left lower lip, left upper lip, and under the tongue.  Eyes:      Pupils: Pupils are equal, round, and reactive to light.   Neck:      Comments: Cervical lymphadenopathy noted bilaterally, nontender, nonerythematous  Cardiovascular:      Rate and Rhythm: Normal rate and regular rhythm.   Pulmonary:      Effort: Pulmonary effort is normal.      Breath sounds: No stridor.      Comments: No wheezes heard on exam.  But at the end of expiration, breath sounds become very coarse.  No crackles noted.  Abdominal:      Palpations: Abdomen is soft.       Tenderness: There is no abdominal tenderness. There is no guarding or rebound.   Musculoskeletal:      Right lower leg: No edema.      Left lower leg: No edema.   Skin:     General: Skin is warm and dry.   Neurological:      General: No focal deficit present.      Mental Status: He is alert.   Psychiatric:         Behavior: Behavior normal.            CRANIAL NERVES     CN III, IV, VI   Pupils are equal, round, and reactive to light.     Significant Labs: All pertinent labs within the past 24 hours have been reviewed.  CBC:   Recent Labs   Lab 06/05/23  1244 06/06/23  0331   WBC 9.44 9.28   HGB 14.2 12.5*   HCT 44.8 40.2    288     CMP:   Recent Labs   Lab 06/05/23  1244 06/06/23  0331    139   K 4.3 4.1    108   CO2 24 21*    108   BUN 9 15   CREATININE 0.9 0.7   CALCIUM 9.9 8.8   PROT 8.1  --    ALBUMIN 4.3  --    BILITOT 0.6  --    ALKPHOS 117  --    AST 20  --    ALT 16  --    ANIONGAP 13 10       Significant Imaging: I have reviewed all pertinent imaging results/findings within the past 24 hours.

## 2023-06-07 NOTE — H&P
[facility] - Emergency Dept  [facility] Medicine  History & Physical    Patient Name: Gordon Dewitt  MRN: [id number]  Patient Class: OP- Observation  Admission Date: 6/5/2023  Attending Physician: Megan att. providers found   Primary Care Provider: Primary Doctor No         Patient information was obtained from patient, spouse/SO, past medical records, ER records and Outpatient pulmonology .     Subjective:     Principal Problem:Recurrent bronchospasm    Chief Complaint:   Chief Complaint   Patient presents with    Cough     Cough and intermittent cp x3 days.        HPI: 63-year-old male with past medical history of CAD, MI with 2 stents in 2022, HFrEF of 30%, HLD, and recurrent bronchospasms who presents to the ED with chief complaint of productive cough, wheezing, sore throat, mouth ulcers, and shortness of breath.  Patient says symptoms began approximately 4 days ago and have progressed since.  Patient says that prior to onset of symptoms, he had a hard week of work because his colleague was out of town.  Patient works with [employer] and said he was working in extremely hot attics surrounded by insulation, sawdust, and mold.  Patient reports associated subjective fevers as well.  He did not measure a fever.  Patient says that in the past, he has had illnesses associated with wheezing which resolved rather quickly with steroids.  Patient says he has started seeing an [facility] pulmonologist (Dr. Chowdhury) who performed a breathing test of which the patient is unsure of the results.  Patient says that he has been prescribed Advair as well as albuterol inhaler and nebulizer.  Patient says that since last Thursday, he has been using the nebulizer every 4-6 hours with no improvement.  Regarding the mouth ulcers, patient says these appeared prior to symptom onset.  Patient is a never smoker and was never diagnosed with asthma as far as he knows.  Day prior to symptom onset, patient stopped taking Advair and prescribed montelukast  because he thought they may be causing side effects.  Patient does tell me that his father was diagnosed with asthma at an older age.  Patient denies any headache, change to vision, chest pain, abdominal pain, nausea, vomiting, urinary symptoms, blood in stool or urine, lower extremity swelling.    Patient was initially admitted to ED observation unit overnight.  CT angiogram of the chest was negative for airspace disease and negative for PE.  IV Solu-Medrol administered as well as scheduled bronchodilators with significant improvement in symptoms overnight.  Plans to discharge the patient morning of 6/6, however, on ambulation saturations dropped to 88%.  At that time hospital medicine contacted to admit the patient.    Of note, patient is on metoprolol and Entresto, both of which were started after myocardial infarction in 2022.      Past Medical History:   Diagnosis Date    CAD (coronary artery disease)     CHF (congestive heart failure), NYHA class I     Hyperlipidemia     Myocardial infarction        No past surgical history on file.    Review of patient's allergies indicates:  No Known Allergies    No current facility-administered medications on file prior to encounter.     Current Outpatient Medications on File Prior to Encounter   Medication Sig    albuterol (PROVENTIL/VENTOLIN HFA) 90 mcg/actuation inhaler 2 puffs every 4 (four) hours as needed.    aspirin 81 MG Chew Take 81 mg by mouth once daily.    atorvastatin (LIPITOR) 40 MG tablet Take 1 tablet (40 mg total) by mouth every evening.    benzocaine/menthol/zinc chlor (ORAJEL 3X MOUTH SORES MM) by Mucous Membrane route. Apply topically to mouth blisters daily.    BRILINTA 90 mg tablet Take 1 tablet (90 mg total) by mouth 2 (two) times daily.    dapagliflozin (FARXIGA) 10 mg tablet Take 1 tablet (10 mg total) by mouth once daily at 6am.    ENTRESTO 24-26 mg per tablet Take 1 tablet by mouth 2 (two) times daily.    fluticasone-salmeterol diskus inhaler  250-50 mcg Inhale 1 puff into the lungs 2 (two) times daily. Controller    ibuprofen (ADVIL,MOTRIN) 200 MG tablet Take 400 mg by mouth daily as needed for Pain.    levocetirizine (XYZAL) 5 MG tablet Take 1 tablet (5 mg total) by mouth every evening.    montelukast (SINGULAIR) 10 mg tablet Take 10 mg by mouth once daily.    omega-3 fatty acids/fish oil (FISH OIL-OMEGA-3 FATTY ACIDS) 300-1,000 mg capsule Take 1 capsule by mouth once a week.    [DISCONTINUED] azelastine (ASTELIN) 137 mcg (0.1 %) nasal spray 1 spray (137 mcg total) by Nasal route 2 (two) times daily. (Patient taking differently: 1 spray by Nasal route 2 (two) times daily as needed for Rhinitis.)    [DISCONTINUED] fluticasone propionate (FLONASE) 50 mcg/actuation nasal spray 1 spray (50 mcg total) by Each Nostril route once daily. (Patient taking differently: 1 spray by Each Nostril route daily as needed for Rhinitis.)    [DISCONTINUED] metoprolol succinate (TOPROL-XL) 25 MG 24 hr tablet Take 1 tablet (25 mg total) by mouth once daily.    nitroGLYCERIN (NITROSTAT) 0.4 MG SL tablet Place 0.4 mg under the tongue.    [DISCONTINUED] predniSONE (DELTASONE) 20 MG tablet Take 20 mg by mouth 2 (two) times daily.     Family History       Problem Relation (Age of Onset)    Diabetes Father, Brother    Heart disease Brother          Tobacco Use    Smoking status: Never     Passive exposure: Never    Smokeless tobacco: Never   Substance and Sexual Activity    Alcohol use: Never    Drug use: Never    Sexual activity: Yes     Partners: Female     Birth control/protection: None     Comment: Partner post menopausal     Review of Systems per HPI  Objective:     Vital Signs (Most Recent):  Temp: 98.8 °F (37.1 °C) (06/06/23 1428)  Pulse: 83 (06/06/23 1428)  Resp: 16 (06/06/23 1428)  BP: 104/63 (06/06/23 1428)  SpO2: (!) 93 % (06/06/23 1428) Vital Signs (24h Range):  Temp:  [97.7 °F (36.5 °C)-98.8 °F (37.1 °C)] 98.8 °F (37.1 °C)  Pulse:  [70-89] 83  Resp:  [16-22] 16  SpO2:   [88 %-97 %] 93 %  BP: ()/(55-73) 104/63     Weight: 68 kg (150 lb)  Body mass index is 25.73 kg/m².     Physical Exam  Vitals and nursing note reviewed.   Constitutional:       Appearance: He is well-developed.   HENT:      Mouth/Throat:      Comments: Aphthous ulcers noted on inside of left lower lip, left upper lip, and under the tongue.  Eyes:      Pupils: Pupils are equal, round, and reactive to light.   Neck:      Comments: Cervical lymphadenopathy noted bilaterally, nontender, nonerythematous  Cardiovascular:      Rate and Rhythm: Normal rate and regular rhythm.   Pulmonary:      Effort: Pulmonary effort is normal.      Breath sounds: No stridor.      Comments: No wheezes heard on exam.  But at the end of expiration, breath sounds become very coarse.  No crackles noted.  Abdominal:      Palpations: Abdomen is soft.      Tenderness: There is no abdominal tenderness. There is no guarding or rebound.   Musculoskeletal:      Right lower leg: No edema.      Left lower leg: No edema.   Skin:     General: Skin is warm and dry.   Neurological:      General: No focal deficit present.      Mental Status: He is alert.   Psychiatric:         Behavior: Behavior normal.            CRANIAL NERVES     CN III, IV, VI   Pupils are equal, round, and reactive to light.     Significant Labs: All pertinent labs within the past 24 hours have been reviewed.  CBC:   Recent Labs   Lab 06/05/23  1244 06/06/23  0331   WBC 9.44 9.28   HGB 14.2 12.5*   HCT 44.8 40.2    288     CMP:   Recent Labs   Lab 06/05/23  1244 06/06/23  0331    139   K 4.3 4.1    108   CO2 24 21*    108   BUN 9 15   CREATININE 0.9 0.7   CALCIUM 9.9 8.8   PROT 8.1  --    ALBUMIN 4.3  --    BILITOT 0.6  --    ALKPHOS 117  --    AST 20  --    ALT 16  --    ANIONGAP 13 10       Significant Imaging: I have reviewed all pertinent imaging results/findings within the past 24 hours.    Assessment/Plan:     * Recurrent bronchospasm  Cough due to  bronchospasm  Acute viral bronchitis with bronchospasm  History of STEMI  HFrEF  CTA chest personally reviewed with no evidence of airspace disease and no obvious pulmonary embolism.  Patient was administered an additional dose of prednisone followed by an additional dose of IV Solu-Medrol on morning of 6/6.  Patient received another bronchodilator treatment after which he was taken on an ambulatory saturation test during which his oxygen levels did not drop below 92% and patient did not qualify for home oxygen.  Discussed with patient's outpatient pulmonologist, Dr. Chowdhury, and informed that patient underwent pulmonary function testing which was normal and etiology of recurrent bronchospasms unclear.  After discussion, ENT was contacted who agreed that direct visualization of the upper airway would be reasonable at this time.  ENT performed flexible laryngoscopy with no acute or chronic findings.  Symptoms could be explained by bronchospasm that was triggered while working in extreme heat, in tight spaces, with multiple allergens.  An alternative etiology could be a viral bronchitis with bronchospasm as patient did describe a night where he was experiencing subjective fevers along with productive cough, sore throat, and shortness of breath.    Following ambulatory saturation test and flexible laryngoscopy, I discussed with patient's wife who is a pediatrician and discussed further with the patient.  As patient is not requiring oxygen, cough improved, and patient feels 90% better, it is reasonable to discharge the patient to continue treatment at home.  Prescriptions written for antitussives for cough, magic mouthwash for ulcers, and prednisone with refills for albuterol nebulizers for inflammation of the airways and bronchospasm.  I placed referral for outpatient allergist follow-up.  I also recommended that patient hold metoprolol until he completes course of steroids as metoprolol may be contributing to  bronchospasm.  Patient could consider discussion with cardiologist regarding a trial of discontinuation of metoprolol and Entresto at different times to see if either of these medications are contributing to bronchospasms.  Known adverse effect of metoprolol is bronchospasm and Entresto is chronic cough.  Finally, I recommended the patient avoid working in attics until this episode has totally resolved.  Patient may consider using albuterol prior to initiating work especially if in enclosed, hot, elizabeth spaces.        VTE Risk Mitigation (From admission, onward)           Ordered     IP VTE HIGH RISK PATIENT  Once         06/05/23 2321                       On 06/06/2023, patient should be placed in hospital observation services under my care.        Jann Sarabia III, MD  Department of Hospital Medicine  [facility] - Emergency Dept

## 2023-06-07 NOTE — TELEPHONE ENCOUNTER
Pt's wife Mrs. Jessica has given an update as pt has been discharged from the hospital.     State pt is not as SOB as before and mouth ulcers are getting less painful.

## 2023-06-07 NOTE — HPI
63-year-old male with past medical history of CAD, MI with 2 stents in 2022, HFrEF of 30%, HLD, and recurrent bronchospasms who presents to the ED with chief complaint of productive cough, wheezing, sore throat, mouth ulcers, and shortness of breath.  Patient says symptoms began approximately 4 days ago and have progressed since.  Patient says that prior to onset of symptoms, he had a hard week of work because his colleague was out of town.  Patient works with [employer] and said he was working in extremely hot attics surrounded by insulation, sawdust, and mold.  Patient reports associated subjective fevers as well.  He did not measure a fever.  Patient says that in the past, he has had illnesses associated with wheezing which resolved rather quickly with steroids.  Patient says he has started seeing an [facility] pulmonologist (Dr. Chowdhury) who performed a breathing test of which the patient is unsure of the results.  Patient says that he has been prescribed Advair as well as albuterol inhaler and nebulizer.  Patient says that since last Thursday, he has been using the nebulizer every 4-6 hours with no improvement.  Regarding the mouth ulcers, patient says these appeared prior to symptom onset.  Patient is a never smoker and was never diagnosed with asthma as far as he knows.  Day prior to symptom onset, patient stopped taking Advair and prescribed montelukast because he thought they may be causing side effects.  Patient does tell me that his father was diagnosed with asthma at an older age.  Patient denies any headache, change to vision, chest pain, abdominal pain, nausea, vomiting, urinary symptoms, blood in stool or urine, lower extremity swelling.    Patient was initially admitted to ED observation unit overnight.  CT angiogram of the chest was negative for airspace disease and negative for PE.  IV Solu-Medrol administered as well as scheduled bronchodilators with significant improvement in symptoms overnight.   Plans to discharge the patient morning of 6/6, however, on ambulation saturations dropped to 88%.  At that time hospital medicine contacted to admit the patient.    Of note, patient is on metoprolol and Entresto, both of which were started after myocardial infarction in 2022.

## 2023-06-07 NOTE — HOSPITAL COURSE
Patient admitted for further evaluation and treatment of bronchospasm with associated wheezing, sore throat, mouth ulcers, productive cough, and shortness of breath.  CTA chest personally reviewed with no evidence of airspace disease and no obvious pulmonary embolism.  Patient was administered an additional dose of prednisone followed by an additional dose of IV Solu-Medrol on morning of 6/6.  Patient received another bronchodilator treatment after which he was taken on an ambulatory saturation test during which his oxygen levels did not drop below 92% and patient did not qualify for home oxygen.  Discussed with patient's outpatient pulmonologist, Dr. Chowdhury, and I was informed that patient underwent pulmonary function testing which was normal and etiology of recurrent bronchospasms unclear.  After discussion, ENT was contacted who agreed that direct visualization of the upper airway would be reasonable at this time.  ENT performed flexible laryngoscopy with no acute or chronic findings.  Symptoms could be explained by bronchospasm that was triggered while working in extreme heat, in tight spaces, with multiple allergens.  An alternative etiology could be a viral bronchitis with bronchospasm as patient did describe a night where he was experiencing subjective fevers along with productive cough, sore throat, and shortness of breath.     Following ambulatory saturation test and flexible laryngoscopy, I discussed with patient's wife who is a pediatrician and discussed further with the patient.  As patient is not requiring oxygen, cough improved, and patient feels 90% better, it is reasonable to discharge the patient to continue treatment at home.  Prescriptions written for antitussives for cough, magic mouthwash for ulcers, and prednisone with refills for albuterol nebulizers for inflammation of the airways and bronchospasm.  I placed referral for outpatient allergist follow-up.  I also recommended that patient  hold metoprolol until he completes course of steroids as metoprolol may be contributing to bronchospasm.  Patient could consider discussion with cardiologist regarding a trial of discontinuation of metoprolol and Entresto at different times to see if either of these medications are contributing to bronchospasms.  Known adverse effect of metoprolol is bronchospasm and Entresto is chronic cough.  Finally, I recommended the patient avoid working in attics until this episode has totally resolved.  Patient may consider using albuterol prior to initiating work especially if in enclosed, hot, elizabeth spaces.  Patient medically stable for discharge with close follow-up to PCP, pulmonology, Cardiology and referral placed for allergist.  Patient requested a CD of CT angiogram which was provided on discharge.    The patient's chronic medical conditions were managed appropriately. Discharge plan of care was discussed at length with patient including patient's need for close outpatient follow-up. Medication counseling also took place with the patient being educated on potential side effects/adverse events of medications. Patient also counseled about avoiding driving, operating machinery, or participating in any activities that may pose harm to self or others if they are taking medications that cause drowsiness or altered mental status. Patient also counseled to take medicines as prescribed and do not drink alcohol, use tobacco products, or use illicit substances. Patient counseled to return to the hospital or seek medical care if baseline status should suddenly change, return of symptoms occurs, or for any new or concerning symptoms that arise. Patient was in agreement with plan of care going forward and was then discharged.

## 2023-06-08 LAB
ENTEROVIRUS/RHINOVIRUS: NOT DETECTED
HUMAN BOCAVIRUS: NOT DETECTED
HUMAN CORONAVIRUS, COMMON COLD VIRUS: NOT DETECTED
INFLUENZA A - H1N1-09: NOT DETECTED
LEGIONELLA PNEUMOPHILA: NOT DETECTED
MORAXELLA CATARRHALIS: NOT DETECTED
PARAINFLUENZA: NOT DETECTED
RVP - ADENOVIRUS: NOT DETECTED
RVP - HUMAN METAPNEUMOVIRUS (HMPV): NOT DETECTED
RVP - INFLUENZA A: NOT DETECTED
RVP - INFLUENZA B: NOT DETECTED
RVP - RESPIRATORY SYNCTIAL VIRUS (RSV) A: NOT DETECTED
RVP - RESPIRATORY VIRAL PANEL, SOURCE: NORMAL
TEM - ACINETOBACTER BAUMANNII: NOT DETECTED
TEM - BORDETELLA PERTUSSIS: NOT DETECTED
TEM - CHLAMYDOPHILA PNEUMONIAE: NOT DETECTED
TEM - KLEBSIELLA PNEUMONIAE: NOT DETECTED
TEM - MRSA: NOT DETECTED
TEM - MYCOPLASMA PNEUMONIAE: NOT DETECTED
TEM - NEISSERIA MENINGITIDIS: NOT DETECTED
TEM - PANTON-VALENTINE: NOT DETECTED
TEM - PSEUDOMONAS AERUGINOSA: NOT DETECTED
TEM - STAPHYLOCOCCUS AUREUS: NOT DETECTED
TEM - STREPTOCOCCUS PNEUMONIAE: NOT DETECTED
TEM - STREPTOCOCCUS PYOGENES A: NOT DETECTED
TEM- HAEMOPHILUS INFLUENZAE B: NOT DETECTED
TEM- HAEMOPHILUS INFLUENZAE: NOT DETECTED

## 2023-06-21 ENCOUNTER — OFFICE VISIT (OUTPATIENT)
Dept: ALLERGY | Facility: CLINIC | Age: 63
End: 2023-06-21
Payer: COMMERCIAL

## 2023-06-21 ENCOUNTER — LAB VISIT (OUTPATIENT)
Dept: LAB | Facility: HOSPITAL | Age: 63
End: 2023-06-21
Attending: STUDENT IN AN ORGANIZED HEALTH CARE EDUCATION/TRAINING PROGRAM
Payer: COMMERCIAL

## 2023-06-21 VITALS
DIASTOLIC BLOOD PRESSURE: 60 MMHG | SYSTOLIC BLOOD PRESSURE: 94 MMHG | HEART RATE: 72 BPM | WEIGHT: 161.63 LBS | BODY MASS INDEX: 27.72 KG/M2

## 2023-06-21 DIAGNOSIS — R05.3 CHRONIC COUGH: Primary | ICD-10-CM

## 2023-06-21 DIAGNOSIS — J31.0 RHINITIS, UNSPECIFIED TYPE: ICD-10-CM

## 2023-06-21 DIAGNOSIS — R05.9 COUGH, UNSPECIFIED TYPE: ICD-10-CM

## 2023-06-21 PROCEDURE — 1159F PR MEDICATION LIST DOCUMENTED IN MEDICAL RECORD: ICD-10-PCS | Mod: CPTII,S$GLB,, | Performed by: STUDENT IN AN ORGANIZED HEALTH CARE EDUCATION/TRAINING PROGRAM

## 2023-06-21 PROCEDURE — 99204 PR OFFICE/OUTPT VISIT, NEW, LEVL IV, 45-59 MIN: ICD-10-PCS | Mod: S$GLB,,, | Performed by: STUDENT IN AN ORGANIZED HEALTH CARE EDUCATION/TRAINING PROGRAM

## 2023-06-21 PROCEDURE — 1159F MED LIST DOCD IN RCRD: CPT | Mod: CPTII,S$GLB,, | Performed by: STUDENT IN AN ORGANIZED HEALTH CARE EDUCATION/TRAINING PROGRAM

## 2023-06-21 PROCEDURE — 4010F ACE/ARB THERAPY RXD/TAKEN: CPT | Mod: CPTII,S$GLB,, | Performed by: STUDENT IN AN ORGANIZED HEALTH CARE EDUCATION/TRAINING PROGRAM

## 2023-06-21 PROCEDURE — 3078F DIAST BP <80 MM HG: CPT | Mod: CPTII,S$GLB,, | Performed by: STUDENT IN AN ORGANIZED HEALTH CARE EDUCATION/TRAINING PROGRAM

## 2023-06-21 PROCEDURE — 3008F PR BODY MASS INDEX (BMI) DOCUMENTED: ICD-10-PCS | Mod: CPTII,S$GLB,, | Performed by: STUDENT IN AN ORGANIZED HEALTH CARE EDUCATION/TRAINING PROGRAM

## 2023-06-21 PROCEDURE — 3044F PR MOST RECENT HEMOGLOBIN A1C LEVEL <7.0%: ICD-10-PCS | Mod: CPTII,S$GLB,, | Performed by: STUDENT IN AN ORGANIZED HEALTH CARE EDUCATION/TRAINING PROGRAM

## 2023-06-21 PROCEDURE — 1160F PR REVIEW ALL MEDS BY PRESCRIBER/CLIN PHARMACIST DOCUMENTED: ICD-10-PCS | Mod: CPTII,S$GLB,, | Performed by: STUDENT IN AN ORGANIZED HEALTH CARE EDUCATION/TRAINING PROGRAM

## 2023-06-21 PROCEDURE — 3074F SYST BP LT 130 MM HG: CPT | Mod: CPTII,S$GLB,, | Performed by: STUDENT IN AN ORGANIZED HEALTH CARE EDUCATION/TRAINING PROGRAM

## 2023-06-21 PROCEDURE — 99999 PR PBB SHADOW E&M-EST. PATIENT-LVL IV: CPT | Mod: PBBFAC,,, | Performed by: STUDENT IN AN ORGANIZED HEALTH CARE EDUCATION/TRAINING PROGRAM

## 2023-06-21 PROCEDURE — 3074F PR MOST RECENT SYSTOLIC BLOOD PRESSURE < 130 MM HG: ICD-10-PCS | Mod: CPTII,S$GLB,, | Performed by: STUDENT IN AN ORGANIZED HEALTH CARE EDUCATION/TRAINING PROGRAM

## 2023-06-21 PROCEDURE — 99204 OFFICE O/P NEW MOD 45 MIN: CPT | Mod: S$GLB,,, | Performed by: STUDENT IN AN ORGANIZED HEALTH CARE EDUCATION/TRAINING PROGRAM

## 2023-06-21 PROCEDURE — 4010F PR ACE/ARB THEARPY RXD/TAKEN: ICD-10-PCS | Mod: CPTII,S$GLB,, | Performed by: STUDENT IN AN ORGANIZED HEALTH CARE EDUCATION/TRAINING PROGRAM

## 2023-06-21 PROCEDURE — 86003 ALLG SPEC IGE CRUDE XTRC EA: CPT | Performed by: STUDENT IN AN ORGANIZED HEALTH CARE EDUCATION/TRAINING PROGRAM

## 2023-06-21 PROCEDURE — 1160F RVW MEDS BY RX/DR IN RCRD: CPT | Mod: CPTII,S$GLB,, | Performed by: STUDENT IN AN ORGANIZED HEALTH CARE EDUCATION/TRAINING PROGRAM

## 2023-06-21 PROCEDURE — 3008F BODY MASS INDEX DOCD: CPT | Mod: CPTII,S$GLB,, | Performed by: STUDENT IN AN ORGANIZED HEALTH CARE EDUCATION/TRAINING PROGRAM

## 2023-06-21 PROCEDURE — 3078F PR MOST RECENT DIASTOLIC BLOOD PRESSURE < 80 MM HG: ICD-10-PCS | Mod: CPTII,S$GLB,, | Performed by: STUDENT IN AN ORGANIZED HEALTH CARE EDUCATION/TRAINING PROGRAM

## 2023-06-21 PROCEDURE — 86003 ALLG SPEC IGE CRUDE XTRC EA: CPT | Mod: 59 | Performed by: STUDENT IN AN ORGANIZED HEALTH CARE EDUCATION/TRAINING PROGRAM

## 2023-06-21 PROCEDURE — 3044F HG A1C LEVEL LT 7.0%: CPT | Mod: CPTII,S$GLB,, | Performed by: STUDENT IN AN ORGANIZED HEALTH CARE EDUCATION/TRAINING PROGRAM

## 2023-06-21 PROCEDURE — 99999 PR PBB SHADOW E&M-EST. PATIENT-LVL IV: ICD-10-PCS | Mod: PBBFAC,,, | Performed by: STUDENT IN AN ORGANIZED HEALTH CARE EDUCATION/TRAINING PROGRAM

## 2023-06-21 PROCEDURE — 36415 COLL VENOUS BLD VENIPUNCTURE: CPT | Performed by: STUDENT IN AN ORGANIZED HEALTH CARE EDUCATION/TRAINING PROGRAM

## 2023-06-21 NOTE — PROGRESS NOTES
ALLERGY & IMMUNOLOGY CLINIC - INITIAL CONSULTATION      HISTORY OF PRESENT ILLNESS     Patient ID: Gordon Dewitt is a 63 y.o. male    CC: chronic cough     HPI: Gordon Dewitt is a 63 y.o. male with a history of CHF and CAD, presenting for chronic cough.  He was referred by Jann Sarabia III, MD ().    Symptoms started around 9/2022. Symptoms have been on and off since then. He says it started out of the blue.  Symptoms include: he will start coughing, then he will get shortness of breath and wheezing with it. He feels it is getting worse at night. It makes it hard to go sleep.  He feels the cough comes from the chest. Its a productive cough. Sometimes the mucus is clear, sometimes yellow. When the mucus comes out, he feels relief for a little while.  He doesn't get wheezing or shortness of breath when not coughing.  He says right now, its not bad, but had to go to ED for it a couple of weeks ago. He says it might last week before he seeks treatment. First time he went 2 months in between episodes, but most recently 2 weeks.   Controller medications include: advair 250-50 mcg, only taking once per day. He hasn't noticed that it helps.  He is on montelukast, but doesn't know if it helps.   He doesn't think albuterol really helped. He was recently prescribed duo nebs to take scheduled 4 times per day, which might have helped (but was also on steroids). He doesn't even recall nebulizer treatment in the hospital helping immediately.  He does think steroids help.  Symptoms are worse with exertion.  He hasn't noticed other triggers. But says one of his episodes seemed to start after exerting himself while at work (works on [employer]).  The patient denies heartburn/reflux symptoms. And reflux medications didn't help his symptoms in the past.  The patient denies orthopnea.   The patient denies lower extremity edema.     He denies frequent rhinitis or ocular pruritus. Occasional congestion. Maybe post nasal drip.  He  stopped flonase and azelastine a few days ago because it didn't seem to make a difference. He says he might still use them when he feels congestion.  He is on xyzal.      MEDICAL HISTORY     Vaccines:   Immunization History   Administered Date(s) Administered    COVID-19, MRNA, LN-S, PF (Pfizer) (Purple Cap) 2021, 2021     Medical Hx:   Patient Active Problem List   Diagnosis    HFrEF (heart failure with reduced ejection fraction)    Coronary artery disease involving native coronary artery of native heart    History of ST elevation myocardial infarction (STEMI)    Hyperlipidemia    S/P drug eluting coronary stent placement    Recurrent bronchospasm    Seasonal allergic rhinitis    Cough due to bronchospasm    Bronchitis, acute, with bronchospasm     Surgical Hx:   History reviewed. No pertinent surgical history.    Medications:   Current Outpatient Medications on File Prior to Visit   Medication Sig Dispense Refill    aspirin 81 MG Chew Take 81 mg by mouth once daily.      atorvastatin (LIPITOR) 40 MG tablet Take 1 tablet (40 mg total) by mouth every evening. 90 tablet 3    BRILINTA 90 mg tablet Take 1 tablet (90 mg total) by mouth 2 (two) times daily. 60 tablet 6    dapagliflozin (FARXIGA) 10 mg tablet Take 1 tablet (10 mg total) by mouth once daily at 6am. 90 tablet 3    ENTRESTO 24-26 mg per tablet Take 1 tablet by mouth 2 (two) times daily. 60 tablet 6    metoprolol succinate (TOPROL-XL) 25 MG 24 hr tablet Take 1 tablet (25 mg total) by mouth once daily. HOLD THIS MEDICATION UNTIL YOU COMPLETE STEROIDS.  IT CAN CONTRIBUTE TO BRONCHOSPASMS 90 tablet 3    montelukast (SINGULAIR) 10 mg tablet Take 10 mg by mouth once daily.      [] (Magic mouthwash) 1:1:1 diphenhydrAMINE(Benadryl) 12.5mg/5ml liq, aluminum & magnesium hydroxide-simethicone (Maalox), LIDOcaine viscous 2% Swish and spit 10 mLs every 4 (four) hours as needed (mouth pain). for mouth sores 90 mL 0    albuterol (PROVENTIL/VENTOLIN HFA)  90 mcg/actuation inhaler 2 puffs every 4 (four) hours as needed.      albuterol-ipratropium (DUO-NEB) 2.5 mg-0.5 mg/3 mL nebulizer solution Take 3 mLs by nebulization every 4 (four) hours as needed for Wheezing. Rescue 90 mL 0    azelastine (ASTELIN) 137 mcg (0.1 %) nasal spray 1 spray (137 mcg total) by Nasal route 2 (two) times daily as needed for Rhinitis. (Patient not taking: Reported on 6/21/2023)      benzocaine/menthol/zinc chlor (ORAJEL 3X MOUTH SORES MM) by Mucous Membrane route. Apply topically to mouth blisters daily.      fluticasone propionate (FLONASE) 50 mcg/actuation nasal spray 1 spray (50 mcg total) by Each Nostril route daily as needed for Rhinitis. (Patient not taking: Reported on 6/21/2023)      fluticasone-salmeterol diskus inhaler 250-50 mcg Inhale 1 puff into the lungs 2 (two) times daily. Controller (Patient not taking: Reported on 6/21/2023) 1 each 11    ibuprofen (ADVIL,MOTRIN) 200 MG tablet Take 400 mg by mouth daily as needed for Pain.      levocetirizine (XYZAL) 5 MG tablet Take 1 tablet (5 mg total) by mouth every evening. (Patient not taking: Reported on 6/21/2023) 30 tablet 11    nitroGLYCERIN (NITROSTAT) 0.4 MG SL tablet Place 0.4 mg under the tongue.      omega-3 fatty acids/fish oil (FISH OIL-OMEGA-3 FATTY ACIDS) 300-1,000 mg capsule Take 1 capsule by mouth once a week.       No current facility-administered medications on file prior to visit.     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     Infection Hx: Denies frequent infections requiring antibiotics. No history of pneumonia.    Social Hx:   Social History     Tobacco Use    Smoking status: Never     Passive exposure: Never    Smokeless tobacco: Never   Substance Use Topics    Alcohol use: Never    Drug use: Never     Family Hx:   Family History   Problem Relation Age of  Onset    Asthma Father     Diabetes Father     Diabetes Brother     Heart disease Brother       PHYSICAL EXAM     VS: BP 94/60 (BP Location: Right arm, Patient Position: Sitting, BP Method: Medium (Automatic))   Pulse 72   Wt 73.3 kg (161 lb 9.6 oz)   BMI 27.72 kg/m²   GENERAL: Alert, NAD, well-appearing  EYES: EOMI, no conjunctival injection, no discharge, no infraorbital shiners  NOSE: NT 2+ B/L, no stringing mucus, no polyps visualized  ORAL: MMM, no thrush, + a couple aphthous ulcers underneath tongue  LUNGS: CTAB, no w/r/c, no increased WOB  HEART: RRR, normal S1/S2, no m/g/r  EXTREMITIES: No LE edema  DERM: no rashes, no skin breaks  NEURO: normal speech, no facial asymmetry     LABORATORY STUDIES     Component      Latest Ref Rng & Units 6/6/2023 6/5/2023 4/14/2023   WBC      3.90 - 12.70 K/uL 9.28 9.44 6.01   RBC      4.60 - 6.20 M/uL 5.02 5.63 4.89   Hemoglobin      14.0 - 18.0 g/dL 12.5 (L) 14.2 13.0 (L)   Hematocrit      40.0 - 54.0 % 40.2 44.8 42.4   MCV      82 - 98 fL 80 (L) 80 (L) 87   MCH      27.0 - 31.0 pg 24.9 (L) 25.2 (L) 26.6 (L)   MCHC      32.0 - 36.0 g/dL 31.1 (L) 31.7 (L) 30.7 (L)   RDW      11.5 - 14.5 % 14.5 14.2 14.6 (H)   Platelets      150 - 450 K/uL 288 331 241   MPV      9.2 - 12.9 fL 10.3 10.4 11.1   Immature Granulocytes      0.0 - 0.5 % 0.3 0.3 0.3   Gran # (ANC)      1.8 - 7.7 K/uL 7.5 6.0 3.0   Immature Grans (Abs)      0.00 - 0.04 K/uL 0.03 0.03 0.02   Lymph #      1.0 - 4.8 K/uL 1.4 2.1 2.2   Mono #      0.3 - 1.0 K/uL 0.4 0.8 0.5   Eos #      0.0 - 0.5 K/uL 0.0 0.4 0.2   Baso #      0.00 - 0.20 K/uL 0.02 0.09 0.04   Differential Method       Automated Automated Automated   Sodium      136 - 145 mmol/L 139 141    Potassium      3.5 - 5.1 mmol/L 4.1 4.3    Chloride      95 - 110 mmol/L 108 104    CO2      23 - 29 mmol/L 21 (L) 24    Glucose      70 - 110 mg/dL 108 103    BUN      8 - 23 mg/dL 15 9    Creatinine      0.5 - 1.4 mg/dL 0.7 0.9    Calcium      8.7 - 10.5 mg/dL 8.8  9.9    PROTEIN TOTAL      6.0 - 8.4 g/dL  8.1    Albumin      3.5 - 5.2 g/dL  4.3    BILIRUBIN TOTAL      0.1 - 1.0 mg/dL  0.6    Alkaline Phosphatase      55 - 135 U/L  117    AST      10 - 40 U/L  20    ALT      10 - 44 U/L  16    IgE      0 - 100 IU/mL   <35   Troponin I      0.000 - 0.026 ng/mL  <0.006    BNP      0 - 99 pg/mL  55    HIV 1/2 Ag/Ab      Non-reactive Non-reactive        ALLERGEN TESTING     Immunocaps: Ordered.     PULMONARY FUNCTION TESTING     Date 2/7/23:  FVC:         94%ile -> + 11%  FEV1:         98%ile -> + 6%  FEV1/FVC: 82%  FEF 25-75: 117%ile  DLCO:        95%ile  Interpretation: Spirometry is normal. Spirometry remains unimproved following bronchodilator. Lung volume determination is normal. DLCO is normal.     IMAGING & OTHER DIAGNOSTICS     CTA chest 6/5/23:  Impression:  No evidence of pulmonary thromboemboli on this limited exam.  No RV strain.  Lungs are clear.    CXR 6/5/23:  FINDINGS:  Cardiomediastinal silhouettewithin normal limits for age.  Lungs grossly clear within limitations of portable technique  Pleura, diaphragm, and thoracic cage grossly unremarkable.  Impression:  No acute cardiopulmonary disease    CXR 3/23/23:  FINDINGS: Heart size and mediastinal contour are normal.  No focal pneumonia. No pulmonary edema, pleural effusion or pneumothorax.  No acute osseous findings.     CHART REVIEW     Reviewed ED note, pulm note, labs, imaging, PFT's.     ASSESSMENT & PLAN     Gordon Dewitt is a 63 y.o. male with     # Chronic cough: Symptoms on and off since 9/2022. He can get wheezing and shortness of breath with the cough, but denies these symptoms when he is not coughing. He only occasionally notices rhinitis, unsure if he gets post nasal drip. He has seen pulm. PFT's were normal. Chest imaging has been normal. He is on advair (only taking once per day), not noticing benefit. He has not found albuterol helpful.  He recently stopped consistent use of flonase and azelastine  nasal sprays because he was not noticing benefit from them. He is on montelukast and levocetirizine, but has not noticed benefit from them. Systemic steroids help. He says symptoms might get bad for about a week before he seeks treatment, but symptoms seem to be returning quicker than before (initial interval was a couple of months, most recent interval was a couple weeks). Current symptoms are under good control since his ED visit a couple of weeks ago. Etiology is unclear. Will evaluate for environmental allergy.  -immunocaps for aeroallergens ordered.  -increase advair 250-50 mcg from 1 puff daily to BID as prescribed.   -continue montelukast and levocetirizine for now, but consider discontinuing if immunocaps is negative for evidence of allergy.  -continue flonase and azelastine nasal sprays prn (he didn't find consistent use helpful).  -continue to follow with pulmonology.      Follow up: 6-8 weeks.    I spent a total of 45 minutes on the day of the visit.  This includes face to face time and non-face to face time preparing to see the patient (eg, review of tests), obtaining and/or reviewing separately obtained history, documenting clinical information in the electronic or other health record, independently interpreting results and communicating results to the patient/family/caregiver, or care coordinator.    Lashay Martinez MD  Allergy/Immunology

## 2023-06-26 ENCOUNTER — PATIENT MESSAGE (OUTPATIENT)
Dept: ALLERGY | Facility: CLINIC | Age: 63
End: 2023-06-26
Payer: COMMERCIAL

## 2023-06-26 LAB
A ALTERNATA IGE QN: <0.1 KU/L
A FUMIGATUS IGE QN: <0.1 KU/L
ALLERGEN BOXELDER MAPLE TREE IGE: <0.1 KU/L
ALLERGEN MAPLE (BOX ELDER) CLASS: NORMAL
ALLERGEN WHITE ASH TREE IGE: <0.1 KU/L
BAHIA GRASS IGE QN: <0.1 KU/L
BERMUDA GRASS IGE QN: <0.1 KU/L
C ALBICANS IGE QN: <0.1 KU/L
C HERBARUM IGE QN: <0.1 KU/L
C LUNATA IGE QN: <0.1 KU/L
CAT DANDER IGE QN: <0.1 KU/L
CEDAR IGE QN: <0.1 KU/L
COTTONWOOD IGE QN: <0.1 KU/L
D FARINAE IGE QN: <0.1 KU/L
D PTERONYSS IGE QN: <0.1 KU/L
DEPRECATED A ALTERNATA IGE RAST QL: NORMAL
DEPRECATED A FUMIGATUS IGE RAST QL: NORMAL
DEPRECATED BAHIA GRASS IGE RAST QL: NORMAL
DEPRECATED BERMUDA GRASS IGE RAST QL: NORMAL
DEPRECATED C ALBICANS IGE RAST QL: NORMAL
DEPRECATED C HERBARUM IGE RAST QL: NORMAL
DEPRECATED C LUNATA IGE RAST QL: NORMAL
DEPRECATED CAT DANDER IGE RAST QL: NORMAL
DEPRECATED CEDAR IGE RAST QL: NORMAL
DEPRECATED COTTONWOOD IGE RAST QL: NORMAL
DEPRECATED D FARINAE IGE RAST QL: NORMAL
DEPRECATED D PTERONYSS IGE RAST QL: NORMAL
DEPRECATED DOG DANDER IGE RAST QL: NORMAL
DEPRECATED ELDER IGE RAST QL: NORMAL
DEPRECATED ENGL PLANTAIN IGE RAST QL: NORMAL
DEPRECATED JOHNSON GRASS IGE RAST QL: NORMAL
DEPRECATED M RACEMOSUS IGE RAST QL: NORMAL
DEPRECATED P NOTATUM IGE RAST QL: NORMAL
DEPRECATED PECAN/HICK TREE IGE RAST QL: NORMAL
DEPRECATED ROACH IGE RAST QL: NORMAL
DEPRECATED S ROSTRATA IGE RAST QL: NORMAL
DEPRECATED SALTWORT IGE RAST QL: NORMAL
DEPRECATED SHEEP SORREL IGE RAST QL: NORMAL
DEPRECATED SILVER BIRCH IGE RAST QL: NORMAL
DEPRECATED TIMOTHY IGE RAST QL: NORMAL
DEPRECATED WEST RAGWEED IGE RAST QL: NORMAL
DEPRECATED WHITE OAK IGE RAST QL: NORMAL
DOG DANDER IGE QN: <0.1 KU/L
ELDER IGE QN: <0.1 KU/L
ENGL PLANTAIN IGE QN: <0.1 KU/L
EPICOCCUM PUPURASCENS CLASS: NORMAL
EPICOCCUM PURPURASCENS, IGE: <0.1 KU/L
JOHNSON GRASS IGE QN: <0.1 KU/L
M RACEMOSUS IGE QN: <0.1 KU/L
P NOTATUM IGE QN: <0.1 KU/L
PECAN/HICK TREE IGE QN: <0.1 KU/L
ROACH IGE QN: <0.1 KU/L
S ROSTRATA IGE QN: <0.1 KU/L
SALTWORT IGE QN: <0.1 KU/L
SHEEP SORREL IGE QN: <0.1 KU/L
SILVER BIRCH IGE QN: <0.1 KU/L
TIMOTHY IGE QN: <0.1 KU/L
WEST RAGWEED IGE QN: <0.1 KU/L
WHITE ASH CLASS: NORMAL
WHITE OAK IGE QN: <0.1 KU/L

## 2023-06-29 NOTE — PROGRESS NOTES
ED Observation Unit  Progress Note      HPI   63-year-old male with a history of CHF with an EF of 30%, CAD, HLD, who presents to [facility]/5/2023 for emergent evaluation of cough, SOB, and chest pain associated with a sore throat.     Patient states that sore throat is associated with the development of ulcerative lesions in the back of his throat.  In addition he states that his shortness of breath has worsened to the point that he can barely walk across a room without having to stop.  He has had a new cough for the past several months which has been worked up in the outpatient setting including PFTs which were normal and a chest x-ray which was unremarkable.     In the ED, VSS, ECG with NSR at 76 bpm. R glynn axis. Nonspecific T wave inversion in lead III. Normal intervals. No STEMI. No leukocytosis, left shift, electrolyte abn, GIRMA, liver dysfunction, or signs of ACS or CHF. Patient given breathing treatment and solumedrol 125 mg IV.    Interval History   Patient overall feeling well but desats down to 88% with ambulation.    PMHx   Past Medical History:   Diagnosis Date    CAD (coronary artery disease)     CHF (congestive heart failure), NYHA class I     Hyperlipidemia       No past surgical history on file.     Family Hx   Family History   Problem Relation Age of Onset    Diabetes Father     Diabetes Brother     Heart disease Brother         Social Hx   Social History     Socioeconomic History    Marital status:    Tobacco Use    Smoking status: Never     Passive exposure: Never    Smokeless tobacco: Never   Substance and Sexual Activity    Alcohol use: Never    Drug use: Never    Sexual activity: Yes     Partners: Female     Birth control/protection: None     Comment: Partner post menopausal     Social Determinants of Health     Financial Resource Strain: Low Risk     Difficulty of Paying Living Expenses: Not hard at all   Food Insecurity: No Food Insecurity    Worried About Running Out of Food in the  Last Year: Never true    Ran Out of Food in the Last Year: Never true   Transportation Needs: No Transportation Needs    Lack of Transportation (Medical): No    Lack of Transportation (Non-Medical): No   Physical Activity: Sufficiently Active    Days of Exercise per Week: 6 days    Minutes of Exercise per Session: 60 min   Stress: No Stress Concern Present    Feeling of Stress : Not at all   Social Connections: Moderately Integrated    Frequency of Social Gatherings with Friends and Family: More than three times a week    Attends Jew Services: More than 4 times per year    Active Member of Clubs or Organizations: No    Attends Club or Organization Meetings: Never    Marital Status:    Housing Stability: Unknown    Unable to Pay for Housing in the Last Year: No    Number of Places Lived in the Last Year: 1        Vital Signs   Vitals:    06/06/23 0422 06/06/23 0604 06/06/23 0755 06/06/23 0822   BP:  121/71 114/73    BP Location:  Left arm Left arm    Patient Position:  Lying Lying    Pulse: 75 81 77 75   Resp: 19 18 20 20   Temp:  97.9 °F (36.6 °C) 97.9 °F (36.6 °C)    TempSrc:  Oral Oral    SpO2: 95% 95% 96% 96%   Weight:       Height:         Labs/Imaging   Labs Reviewed   CBC W/ AUTO DIFFERENTIAL - Abnormal; Notable for the following components:       Result Value    MCV 80 (*)     MCH 25.2 (*)     MCHC 31.7 (*)     All other components within normal limits   CBC W/ AUTO DIFFERENTIAL - Abnormal; Notable for the following components:    Hemoglobin 12.5 (*)     MCV 80 (*)     MCH 24.9 (*)     MCHC 31.1 (*)     Gran % 80.9 (*)     Lymph % 14.5 (*)     All other components within normal limits    Narrative:     Release to patient->Immediate   BASIC METABOLIC PANEL - Abnormal; Notable for the following components:    CO2 21 (*)     All other components within normal limits    Narrative:     Release to patient->Immediate   ISTAT PROCEDURE - Abnormal; Notable for the following components:    POC PO2 23 (*)      POC HCO3 23.1 (*)     POC SATURATED O2 39 (*)     All other components within normal limits   COMPREHENSIVE METABOLIC PANEL   TROPONIN I   B-TYPE NATRIURETIC PEPTIDE   SARS-COV-2 RNA AMPLIFICATION, QUAL   TROPONIN ISTAT   HIV 1 / 2 ANTIBODY    Narrative:     Release to patient->Immediate   HEPATITIS C ANTIBODY    Narrative:     Release to patient->Immediate   RESPIRATORY PATHOGENS PANEL (TEM-PCR)   POCT TROPONIN      Imaging Results              CTA Chest Non-Coronary (PE Studies) (Final result)  Result time 06/05/23 22:13:21      Final result by Nimco Clarke MD (06/05/23 22:13:21)                   Impression:      No evidence of pulmonary thromboemboli on this limited exam.    No RV strain.    Lungs are clear.      Electronically signed by: Nimco Clarke  Date:    06/05/2023  Time:    22:13               Narrative:    EXAMINATION:  CTA CHEST NON CORONARY (PE STUDIES)    CLINICAL HISTORY:  Pulmonary embolism (PE) suspected, high prob;    TECHNIQUE:  Low dose axial images, sagittal and coronal reformations were obtained from the thoracic inlet to the lung bases following the IV administration of 75 mL of Omnipaque 350.    COMPARISON:  None    FINDINGS:  Pulmonary vasculature: Limited contrast bolus.  No evidence of pulmonary thromboemboli to the level of the segmental arteries. Pulmonary arteries distribute normally.  There are four pulmonary veins.    Anastasia/Mediastinum: No pathologic eli enlargement.    Airways: Patent.    Lungs/Pleura: Clear lungs. No pleural effusion or thickening.    Aorta: Left-sided aortic arch.  No aneurysm and no significant atherosclerosis    Heart: No evidence of right ventricular strain.  Heart is normal in size.  No effusion.    Base of Neck: No significant abnormality.    Thoracic soft tissues: Unremarkable.    Esophagus: Unremarkable.    Upper Abdomen: No abnormality of the partially imaged upper abdomen.    Bones: No acute fracture. No suspicious lytic or sclerotic  lesions.                                       X-Ray Chest AP Portable (Final result)  Result time 06/05/23 14:48:04      Final result by Russ Wilkes Jr., MD (06/05/23 14:48:04)                   Impression:      No acute cardiopulmonary disease      Electronically signed by: Russ Guillen Jr  Date:    06/05/2023  Time:    14:48               Narrative:    EXAMINATION:  XR CHEST AP PORTABLE    CLINICAL HISTORY:  Chest Pain;    TECHNIQUE:  Single frontal view of the chest was performed.    COMPARISON:  None    FINDINGS:  Cardiomediastinal silhouettewithin normal limits for age.    Lungs grossly clear within limitations of portable technique    Pleura, diaphragm, and thoracic cage grossly unremarkable.                                      Plan   Patient will be upgraded to inpatient medicine for further management.  Case discussed with case management, Hospital Medicine.  Family updated on plan of care.   - - -

## 2023-07-08 ENCOUNTER — PATIENT MESSAGE (OUTPATIENT)
Dept: ALLERGY | Facility: CLINIC | Age: 63
End: 2023-07-08
Payer: COMMERCIAL

## 2023-07-10 RX ORDER — BUDESONIDE AND FORMOTEROL FUMARATE DIHYDRATE 160; 4.5 UG/1; UG/1
2 AEROSOL RESPIRATORY (INHALATION) EVERY 12 HOURS
Qty: 10.2 G | Refills: 11 | Status: SHIPPED | OUTPATIENT
Start: 2023-07-10 | End: 2024-07-09

## 2023-07-25 ENCOUNTER — PATIENT MESSAGE (OUTPATIENT)
Dept: INTERNAL MEDICINE | Facility: CLINIC | Age: 63
End: 2023-07-25
Payer: COMMERCIAL

## 2023-07-25 DIAGNOSIS — R76.8 INCREASED IMMUNOGLOBULIN: Primary | ICD-10-CM

## 2023-07-27 NOTE — TELEPHONE ENCOUNTER
Looks like the family is also requesting a referral to rheumatology per recommendations from the pt's cardiologist.  Please advise.

## 2023-08-02 ENCOUNTER — DOCUMENTATION ONLY (OUTPATIENT)
Dept: INTERNAL MEDICINE | Facility: CLINIC | Age: 63
End: 2023-08-02
Payer: COMMERCIAL

## 2023-08-02 NOTE — PROGRESS NOTES
Immunoglobulin A, G, M results received   All are WNL.     Immunoglobulin G level is on high level of normal at 1238 per [facility] results dated: 07/27/2023

## 2023-08-14 ENCOUNTER — TELEPHONE (OUTPATIENT)
Dept: ALLERGY | Facility: CLINIC | Age: 63
End: 2023-08-14
Payer: COMMERCIAL

## 2023-08-14 ENCOUNTER — OFFICE VISIT (OUTPATIENT)
Dept: ALLERGY | Facility: CLINIC | Age: 63
End: 2023-08-14
Payer: COMMERCIAL

## 2023-08-14 VITALS
WEIGHT: 160.5 LBS | DIASTOLIC BLOOD PRESSURE: 63 MMHG | HEART RATE: 57 BPM | OXYGEN SATURATION: 98 % | BODY MASS INDEX: 27.54 KG/M2 | SYSTOLIC BLOOD PRESSURE: 97 MMHG

## 2023-08-14 DIAGNOSIS — K12.0 RECURRENT APHTHOUS ULCER: ICD-10-CM

## 2023-08-14 DIAGNOSIS — R05.3 CHRONIC COUGH: Primary | ICD-10-CM

## 2023-08-14 PROCEDURE — 3044F PR MOST RECENT HEMOGLOBIN A1C LEVEL <7.0%: ICD-10-PCS | Mod: CPTII,S$GLB,, | Performed by: STUDENT IN AN ORGANIZED HEALTH CARE EDUCATION/TRAINING PROGRAM

## 2023-08-14 PROCEDURE — 4010F ACE/ARB THERAPY RXD/TAKEN: CPT | Mod: CPTII,S$GLB,, | Performed by: STUDENT IN AN ORGANIZED HEALTH CARE EDUCATION/TRAINING PROGRAM

## 2023-08-14 PROCEDURE — 3074F SYST BP LT 130 MM HG: CPT | Mod: CPTII,S$GLB,, | Performed by: STUDENT IN AN ORGANIZED HEALTH CARE EDUCATION/TRAINING PROGRAM

## 2023-08-14 PROCEDURE — 1159F PR MEDICATION LIST DOCUMENTED IN MEDICAL RECORD: ICD-10-PCS | Mod: CPTII,S$GLB,, | Performed by: STUDENT IN AN ORGANIZED HEALTH CARE EDUCATION/TRAINING PROGRAM

## 2023-08-14 PROCEDURE — 1160F RVW MEDS BY RX/DR IN RCRD: CPT | Mod: CPTII,S$GLB,, | Performed by: STUDENT IN AN ORGANIZED HEALTH CARE EDUCATION/TRAINING PROGRAM

## 2023-08-14 PROCEDURE — 1159F MED LIST DOCD IN RCRD: CPT | Mod: CPTII,S$GLB,, | Performed by: STUDENT IN AN ORGANIZED HEALTH CARE EDUCATION/TRAINING PROGRAM

## 2023-08-14 PROCEDURE — 1160F PR REVIEW ALL MEDS BY PRESCRIBER/CLIN PHARMACIST DOCUMENTED: ICD-10-PCS | Mod: CPTII,S$GLB,, | Performed by: STUDENT IN AN ORGANIZED HEALTH CARE EDUCATION/TRAINING PROGRAM

## 2023-08-14 PROCEDURE — 3008F PR BODY MASS INDEX (BMI) DOCUMENTED: ICD-10-PCS | Mod: CPTII,S$GLB,, | Performed by: STUDENT IN AN ORGANIZED HEALTH CARE EDUCATION/TRAINING PROGRAM

## 2023-08-14 PROCEDURE — 3008F BODY MASS INDEX DOCD: CPT | Mod: CPTII,S$GLB,, | Performed by: STUDENT IN AN ORGANIZED HEALTH CARE EDUCATION/TRAINING PROGRAM

## 2023-08-14 PROCEDURE — 3078F DIAST BP <80 MM HG: CPT | Mod: CPTII,S$GLB,, | Performed by: STUDENT IN AN ORGANIZED HEALTH CARE EDUCATION/TRAINING PROGRAM

## 2023-08-14 PROCEDURE — 99999 PR PBB SHADOW E&M-EST. PATIENT-LVL IV: ICD-10-PCS | Mod: PBBFAC,,, | Performed by: STUDENT IN AN ORGANIZED HEALTH CARE EDUCATION/TRAINING PROGRAM

## 2023-08-14 PROCEDURE — 3078F PR MOST RECENT DIASTOLIC BLOOD PRESSURE < 80 MM HG: ICD-10-PCS | Mod: CPTII,S$GLB,, | Performed by: STUDENT IN AN ORGANIZED HEALTH CARE EDUCATION/TRAINING PROGRAM

## 2023-08-14 PROCEDURE — 99999 PR PBB SHADOW E&M-EST. PATIENT-LVL IV: CPT | Mod: PBBFAC,,, | Performed by: STUDENT IN AN ORGANIZED HEALTH CARE EDUCATION/TRAINING PROGRAM

## 2023-08-14 PROCEDURE — 3074F PR MOST RECENT SYSTOLIC BLOOD PRESSURE < 130 MM HG: ICD-10-PCS | Mod: CPTII,S$GLB,, | Performed by: STUDENT IN AN ORGANIZED HEALTH CARE EDUCATION/TRAINING PROGRAM

## 2023-08-14 PROCEDURE — 3044F HG A1C LEVEL LT 7.0%: CPT | Mod: CPTII,S$GLB,, | Performed by: STUDENT IN AN ORGANIZED HEALTH CARE EDUCATION/TRAINING PROGRAM

## 2023-08-14 PROCEDURE — 4010F PR ACE/ARB THEARPY RXD/TAKEN: ICD-10-PCS | Mod: CPTII,S$GLB,, | Performed by: STUDENT IN AN ORGANIZED HEALTH CARE EDUCATION/TRAINING PROGRAM

## 2023-08-14 PROCEDURE — 99215 OFFICE O/P EST HI 40 MIN: CPT | Mod: S$GLB,,, | Performed by: STUDENT IN AN ORGANIZED HEALTH CARE EDUCATION/TRAINING PROGRAM

## 2023-08-14 PROCEDURE — 99215 PR OFFICE/OUTPT VISIT, EST, LEVL V, 40-54 MIN: ICD-10-PCS | Mod: S$GLB,,, | Performed by: STUDENT IN AN ORGANIZED HEALTH CARE EDUCATION/TRAINING PROGRAM

## 2023-08-14 NOTE — TELEPHONE ENCOUNTER
Good morning Dr. Martinez,    Kindly note I spoke with the  at the Rheumatology Department and she sent a message directly to the Rheumatology Nurse who will ring the patient to schedule the Rheumatology appointment.    I rang the patient to inform him of above message.     Kind regards  Jenifer Moya MA

## 2023-08-14 NOTE — PROGRESS NOTES
"ALLERGY & IMMUNOLOGY CLINIC - FOLLOW UP     HISTORY OF PRESENT ILLNESS     Patient ID: Gordon Dewitt is a 63 y.o. male    CC: chronic cough, recurrent aphthous ulcers     HPI: Gordon Dewitt is a 63 y.o. male with a history of CHF and CAD, following up chronic cough, and now with worsening of aphthous ulcers.    He says his cough and his breathing has been under good control since last visit. He says he is using symbicort.   He is using flonase 2 SEN BID.  He is taking levocetirizine and montelukast at night.     His aphthous ulcers worsened in mid July.  He got mouth ulcers in the past, but they would go away within a week.   He had some aphthous ulcers at our last visit, but they are now worse.  He got prednisone a few weeks ago for the ulcers. The pain got better while on the prednisone, but they didn't go away completely.   He says one ulcer will go away and new ones will pop up. He hasn't been ulcer-free for the past month.   He has also been getting a few pustules on the face. He will squeeze and white stuff will come up. He has scab on left cheek where he says one was. He says they can be tender.     His nephew is a pulmonologist in [place]. Patient has note on his phone from him.  Per the note on patient's phone from his nephew:   "With regards to his asthma, two ED visits in the past year. Failed controller therapy with 500/50 advair. Required a 7 day prednisone regimen 8/5 to 8/12 for uncontrolled asthma. Now stable on symbicort 160 two INH BID, montelukast 10 mg, and albuterol prn.   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 Not biopsy proven. Has a referral to rheumatology.  ?Head/neck/nasopharyngeal symptoms.   + aphthous ulcers. No known orbital/GI/renal involvement. CT chest w/o parenchymal disease.   Aphthous ulcers: His biggest concern. Persisted after switching from DPI to MDI. B12 and folate levels are normal. Nontraumatic. No SLS toothpaste. Previously given magic mouthwash " "w/o any improvement. Improved while on prednisone for his asthma exacerbation."     MEDICAL HISTORY     Vaccines:   Immunization History   Administered Date(s) Administered    COVID-19, MRNA, LN-S, PF (Pfizer) (Purple Cap) 04/04/2021, 05/02/2021     Medical Hx:   Patient Active Problem List   Diagnosis    HFrEF (heart failure with reduced ejection fraction)    Coronary artery disease involving native coronary artery of native heart    History of ST elevation myocardial infarction (STEMI)    Hyperlipidemia    S/P drug eluting coronary stent placement    Recurrent bronchospasm    Seasonal allergic rhinitis    Cough due to bronchospasm    Bronchitis, acute, with bronchospasm     Surgical Hx:   History reviewed. No pertinent surgical history.    Medications:   Current Outpatient Medications on File Prior to Visit   Medication Sig Dispense Refill    albuterol (PROVENTIL/VENTOLIN HFA) 90 mcg/actuation inhaler 2 puffs every 4 (four) hours as needed.      aspirin 81 MG Chew Take 81 mg by mouth once daily.      atorvastatin (LIPITOR) 40 MG tablet Take 1 tablet (40 mg total) by mouth every evening. 90 tablet 3    azelastine (ASTELIN) 137 mcg (0.1 %) nasal spray 1 spray (137 mcg total) by Nasal route 2 (two) times daily as needed for Rhinitis.      benzocaine/menthol/zinc chlor (ORAJEL 3X MOUTH SORES MM) by Mucous Membrane route. Apply topically to mouth blisters daily.      BRILINTA 90 mg tablet Take 1 tablet (90 mg total) by mouth 2 (two) times daily. 60 tablet 6    budesonide-formoterol 160-4.5 mcg (SYMBICORT) 160-4.5 mcg/actuation HFAA Inhale 2 puffs into the lungs every 12 (twelve) hours. Controller 10.2 g 11    dapagliflozin (FARXIGA) 10 mg tablet Take 1 tablet (10 mg total) by mouth once daily at 6am. 90 tablet 3    ENTRESTO 24-26 mg per tablet Take 1 tablet by mouth 2 (two) times daily. 60 tablet 6    fluticasone propionate (FLONASE) 50 mcg/actuation nasal spray 1 spray (50 mcg total) by Each Nostril route daily as " needed for Rhinitis.      fluticasone-salmeterol diskus inhaler 250-50 mcg Inhale 1 puff into the lungs 2 (two) times daily. Controller 1 each 11    ibuprofen (ADVIL,MOTRIN) 200 MG tablet Take 400 mg by mouth daily as needed for Pain.      levocetirizine (XYZAL) 5 MG tablet Take 1 tablet (5 mg total) by mouth every evening. 30 tablet 11    metoprolol succinate (TOPROL-XL) 25 MG 24 hr tablet Take 1 tablet (25 mg total) by mouth once daily. HOLD THIS MEDICATION UNTIL YOU COMPLETE STEROIDS.  IT CAN CONTRIBUTE TO BRONCHOSPASMS 90 tablet 3    montelukast (SINGULAIR) 10 mg tablet Take 10 mg by mouth once daily.      omega-3 fatty acids/fish oil (FISH OIL-OMEGA-3 FATTY ACIDS) 300-1,000 mg capsule Take 1 capsule by mouth once a week.      albuterol-ipratropium (DUO-NEB) 2.5 mg-0.5 mg/3 mL nebulizer solution Take 3 mLs by nebulization every 4 (four) hours as needed for Wheezing. Rescue 90 mL 0    nitroGLYCERIN (NITROSTAT) 0.4 MG SL tablet Place 0.4 mg under the tongue.       No current facility-administered medications on file prior to visit.     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   Infection Hx: Denies frequent infections requiring antibiotics. No history of pneumonia.     PHYSICAL EXAM     VS: BP 97/63 (BP Location: Right arm, Patient Position: Sitting, BP Method: Medium (Automatic))   Pulse (!) 57   Wt 72.8 kg (160 lb 7.9 oz)   SpO2 98%   BMI 27.54 kg/m²   GENERAL: Alert, NAD, well-appearing  EYES: EOMI, no conjunctival injection, no discharge, no infraorbital shiners  NOSE: NT 2+ B/L, no stringing mucus, no polyps  visualized  ORAL: MMM, no thrush, + multiple aphthous ulcers including several underneath tongue  LUNGS: CTAB, no w/r/c, no increased WOB  HEART: RRR, normal S1/S2, no m/g/r  EXTREMITIES: No LE edema  DERM: no rashes  NEURO: normal speech, no facial asymmetry     LABORATORY STUDIES     7/18/23:     7/18/23 - IgG4 level reported to be elevated at 257.5 mg/dL.    Component      Latest Ref Rng & Units 6/6/2023 6/5/2023 4/14/2023   WBC      3.90 - 12.70 K/uL 9.28 9.44 6.01   RBC      4.60 - 6.20 M/uL 5.02 5.63 4.89   Hemoglobin      14.0 - 18.0 g/dL 12.5 (L) 14.2 13.0 (L)   Hematocrit      40.0 - 54.0 % 40.2 44.8 42.4   MCV      82 - 98 fL 80 (L) 80 (L) 87   MCH      27.0 - 31.0 pg 24.9 (L) 25.2 (L) 26.6 (L)   MCHC      32.0 - 36.0 g/dL 31.1 (L) 31.7 (L) 30.7 (L)   RDW      11.5 - 14.5 % 14.5 14.2 14.6 (H)   Platelets      150 - 450 K/uL 288 331 241   MPV      9.2 - 12.9 fL 10.3 10.4 11.1   Immature Granulocytes      0.0 - 0.5 % 0.3 0.3 0.3   Gran # (ANC)      1.8 - 7.7 K/uL 7.5 6.0 3.0   Immature Grans (Abs)      0.00 - 0.04 K/uL 0.03 0.03 0.02   Lymph #      1.0 - 4.8 K/uL 1.4 2.1 2.2   Mono #      0.3 - 1.0 K/uL 0.4 0.8 0.5   Eos #      0.0 - 0.5 K/uL 0.0 0.4 0.2   Baso #      0.00 - 0.20 K/uL 0.02 0.09 0.04   Differential Method       Automated Automated Automated   Sodium      136 - 145 mmol/L 139 141    Potassium      3.5 - 5.1 mmol/L 4.1 4.3    Chloride      95 - 110 mmol/L 108 104    CO2      23 - 29 mmol/L 21 (L) 24    Glucose      70 - 110 mg/dL 108 103    BUN      8 - 23 mg/dL 15 9    Creatinine      0.5 - 1.4 mg/dL 0.7 0.9    Calcium      8.7 - 10.5 mg/dL 8.8 9.9    PROTEIN TOTAL      6.0 - 8.4 g/dL  8.1    Albumin      3.5 - 5.2 g/dL  4.3    BILIRUBIN TOTAL      0.1 - 1.0 mg/dL  0.6    Alkaline Phosphatase      55 - 135 U/L  117    AST      10 - 40 U/L  20    ALT      10 - 44 U/L  16    IgE      0 - 100 IU/mL   <35   Troponin I      0.000 - 0.026 ng/mL  <0.006    BNP      0 - 99 pg/mL  55    HIV 1/2 Ag/Ab       Non-reactive Non-reactive        ALLERGEN TESTING     Immunocaps:   Component      Latest Ref Rng & Units 6/21/2023 6/21/2023 6/21/2023          10:34 AM 10:34 AM 10:34 AM   D. farinae      <0.10 kU/L   <0.10   D. farinae Class         CLASS 0   Mite Dust Pteronyssinus IgE      <0.10 kU/L   <0.10   D. pteronyssinus Class         CLASS 0   BERMUDA GRASS      <0.10 kU/L   <0.10   [place] Grass Class         CLASS 0   Ash Grass      <0.10 kU/L   <0.10   Ash Grass Class         CLASS 0   [place] IgE      <0.10 kU/L   <0.10   [place] Class         CLASS 0   Plantain      <0.10 kU/L   <0.10   English Plantain Class         CLASS 0   White Oak(Quercus alba) IgE      <0.10 kU/L   <0.10   [place], Class         CLASS 0   Pecan [place] Tree      <0.10 kU/L   <0.10   Pecan, Class         CLASS 0   Marshelder IgE      <0.10 kU/L   <0.10   Marshelder Class         CLASS 0   Ragweed, Western IgE      <0.10 kU/L   <0.10   Ragweed, Western, Class         CLASS 0   Alternaria alternata      <0.10 kU/L   <0.10   Altern. alternata Class         CLASS 0   Aspergillus Fumigatus IgE      <0.10 kU/L   <0.10   A. fumigatus Class         CLASS 0   Cat Dander      <0.10 kU/L   <0.10   Cat Epithelium Class         CLASS 0   Cockroach, IgE      <0.10 kU/L CLASS 0 <0.10    Dog Dander, IgE      <0.10 kU/L   <0.10   Dog Dander Class         CLASS 0   WHITE FOSTER TREE      <0.10 kU/L   <0.10   White [place] Class         CLASS 0   Allergen Maple ([place]) IgE      <0.10 kU/L   <0.10   Allergen Maple ([place]) Class         CLASS 0   [place] IgE      <0.10 kU/L   <0.10   [place] Class         CLASS 0   Russian Thistle IgE      <0.10 kU/L   <0.10   Russian Thistle Class         CLASS 0   Allergen Sheep [place] ([place]) IgE      <0.10 kU/L   <0.10   Allergen Sheep [place] ([place]) Class         CLASS 0   BAHIA GRASS      <0.10 kU/L   <0.10   [place] Class         CLASS 0   ALEX GRASS      <0.10 kU/L   <0.10   Alex Grass Class          CLASS 0   Silver Birch IgE      <0.10 kU/L   <0.10   Silver Birch Class         CLASS 0   Allergen Candida albicans IgE      <0.10 kU/L   <0.10   Allergen Candida albicans Class         CLASS 0   Cladosporium, IgE      <0.10 kU/L   <0.10   Cladosporium Class         CLASS 0   Curvularia lunata      <0.10 kU/L   <0.10   Curvularia Lunata Class         CLASS 0   Epicoccum purpurascens, IgE      <0.10 kU/L   <0.10   Epicoccum pupurascens Class         CLASS 0   Helminthosporium Halodes IgE      <0.10 kU/L   <0.10   Helminthosporium Class         CLASS 0   Mucor racemosus, IgE      <0.10 kU/L   <0.10   Mucor racemosus Class         CLASS 0   Penicillium, IgE      <0.10 kU/L   <0.10   Penicillium Class         CLASS 0      PULMONARY FUNCTION TESTING     Date 2/7/23:  FVC:         94%ile -> + 11%  FEV1:         98%ile -> + 6%  FEV1/FVC: 82%  FEF 25-75: 117%ile  DLCO:        95%ile  Interpretation: Spirometry is normal. Spirometry remains unimproved following bronchodilator. Lung volume determination is normal. DLCO is normal.     IMAGING & OTHER DIAGNOSTICS     CTA chest 6/5/23:  Impression:  No evidence of pulmonary thromboemboli on this limited exam.  No RV strain.  Lungs are clear.    CXR 6/5/23:  FINDINGS:  Cardiomediastinal silhouettewithin normal limits for age.  Lungs grossly clear within limitations of portable technique  Pleura, diaphragm, and thoracic cage grossly unremarkable.  Impression:  No acute cardiopulmonary disease    CXR 3/23/23:  FINDINGS: Heart size and mediastinal contour are normal.  No focal pneumonia. No pulmonary edema, pleural effusion or pneumothorax.  No acute osseous findings.     ASSESSMENT & PLAN     Gordon Dewitt is a 63 y.o. male with     # Chronic cough: Symptoms on and off since 9/2022. He can get wheezing and shortness of breath with the cough, but denies these symptoms when he is not coughing. He would only occasionally notice rhinitis and was unsure about post nasal drip. Didn't  find flonase and azelastine nasal sprays helpful. He has seen pulm. PFT's were normal. Chest imaging has been normal. Unsure if albuterol helped. Systemic steroids helped. In early July, he reported that he had stopped his advair as he was having difficulty with the powder and didn't think it helped, and he requested a different inhaler. Switched to symbicort, but it is unclear when he actually started it (maybe earlier this month). He reports symptoms have been well controlled since he received systemic steroids in June, but a note from his pulmonologist km in [place] says he received prednisone for his respiratory symptoms earlier this month. Immunocaps negative for evidence of allergy.  -continue symbicort 160-4.5 mcg 2 puffs BID.  -continue albuterol prn.  -continue levocetirizine nightly.  -continue montelukast nightly.  -continue flonase 2 SEN BID.  -continue to follow with pulmonology.    # Aphthous ulcers: Were present at our initial visit, but he says they worsened in mid July. He initially told me they worsened after starting the symbicort, but looking back through some of his portal messages with a different provider, it looks like the ulcers worsened before he started the symbicort. I don't know the etiology of the aphthous ulcers. Regardless of the timing of starting the symbicort, I have very low suspicion that the symbicort has worsened the aphthous ulcers (if anything, I think the steroid component would help). His symptoms temporarily improved while on oral steroids. He has been referred to rheum by his IM provider.     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 He has been referred to rheumatology by his IM provider.      Follow up: 4 months    I spent a total of 40 minutes on the day of the visit.  This includes face to face time and non-face to face time preparing to see the patient (eg, review of tests), obtaining and/or reviewing separately obtained history, documenting clinical  information in the electronic or other health record, independently interpreting results and communicating results to the patient/family/caregiver, or care coordinator.    Lashay Martinez MD  Allergy/Immunology

## 2023-08-23 ENCOUNTER — OFFICE VISIT (OUTPATIENT)
Dept: RHEUMATOLOGY | Facility: CLINIC | Age: 63
End: 2023-08-23
Payer: COMMERCIAL

## 2023-08-23 VITALS
HEART RATE: 55 BPM | WEIGHT: 167.13 LBS | HEIGHT: 64 IN | DIASTOLIC BLOOD PRESSURE: 69 MMHG | SYSTOLIC BLOOD PRESSURE: 105 MMHG | BODY MASS INDEX: 28.53 KG/M2

## 2023-08-23 DIAGNOSIS — K12.0 APHTHOUS ULCER: Primary | ICD-10-CM

## 2023-08-23 DIAGNOSIS — J98.01 COUGH DUE TO BRONCHOSPASM: ICD-10-CM

## 2023-08-23 DIAGNOSIS — J98.09 RECURRENT BRONCHOSPASM: ICD-10-CM

## 2023-08-23 DIAGNOSIS — R76.8 INCREASED IMMUNOGLOBULIN: ICD-10-CM

## 2023-08-23 PROCEDURE — 3074F PR MOST RECENT SYSTOLIC BLOOD PRESSURE < 130 MM HG: ICD-10-PCS | Mod: CPTII,S$GLB,, | Performed by: STUDENT IN AN ORGANIZED HEALTH CARE EDUCATION/TRAINING PROGRAM

## 2023-08-23 PROCEDURE — 99204 OFFICE O/P NEW MOD 45 MIN: CPT | Mod: S$GLB,,, | Performed by: STUDENT IN AN ORGANIZED HEALTH CARE EDUCATION/TRAINING PROGRAM

## 2023-08-23 PROCEDURE — 1159F PR MEDICATION LIST DOCUMENTED IN MEDICAL RECORD: ICD-10-PCS | Mod: CPTII,S$GLB,, | Performed by: STUDENT IN AN ORGANIZED HEALTH CARE EDUCATION/TRAINING PROGRAM

## 2023-08-23 PROCEDURE — 1160F RVW MEDS BY RX/DR IN RCRD: CPT | Mod: CPTII,S$GLB,, | Performed by: STUDENT IN AN ORGANIZED HEALTH CARE EDUCATION/TRAINING PROGRAM

## 2023-08-23 PROCEDURE — 99999 PR PBB SHADOW E&M-EST. PATIENT-LVL V: CPT | Mod: PBBFAC,,, | Performed by: STUDENT IN AN ORGANIZED HEALTH CARE EDUCATION/TRAINING PROGRAM

## 2023-08-23 PROCEDURE — 4010F ACE/ARB THERAPY RXD/TAKEN: CPT | Mod: CPTII,S$GLB,, | Performed by: STUDENT IN AN ORGANIZED HEALTH CARE EDUCATION/TRAINING PROGRAM

## 2023-08-23 PROCEDURE — 3078F DIAST BP <80 MM HG: CPT | Mod: CPTII,S$GLB,, | Performed by: STUDENT IN AN ORGANIZED HEALTH CARE EDUCATION/TRAINING PROGRAM

## 2023-08-23 PROCEDURE — 1160F PR REVIEW ALL MEDS BY PRESCRIBER/CLIN PHARMACIST DOCUMENTED: ICD-10-PCS | Mod: CPTII,S$GLB,, | Performed by: STUDENT IN AN ORGANIZED HEALTH CARE EDUCATION/TRAINING PROGRAM

## 2023-08-23 PROCEDURE — 3074F SYST BP LT 130 MM HG: CPT | Mod: CPTII,S$GLB,, | Performed by: STUDENT IN AN ORGANIZED HEALTH CARE EDUCATION/TRAINING PROGRAM

## 2023-08-23 PROCEDURE — 99999 PR PBB SHADOW E&M-EST. PATIENT-LVL V: ICD-10-PCS | Mod: PBBFAC,,, | Performed by: STUDENT IN AN ORGANIZED HEALTH CARE EDUCATION/TRAINING PROGRAM

## 2023-08-23 PROCEDURE — 3044F HG A1C LEVEL LT 7.0%: CPT | Mod: CPTII,S$GLB,, | Performed by: STUDENT IN AN ORGANIZED HEALTH CARE EDUCATION/TRAINING PROGRAM

## 2023-08-23 PROCEDURE — 99204 PR OFFICE/OUTPT VISIT, NEW, LEVL IV, 45-59 MIN: ICD-10-PCS | Mod: S$GLB,,, | Performed by: STUDENT IN AN ORGANIZED HEALTH CARE EDUCATION/TRAINING PROGRAM

## 2023-08-23 PROCEDURE — 4010F PR ACE/ARB THEARPY RXD/TAKEN: ICD-10-PCS | Mod: CPTII,S$GLB,, | Performed by: STUDENT IN AN ORGANIZED HEALTH CARE EDUCATION/TRAINING PROGRAM

## 2023-08-23 PROCEDURE — 1159F MED LIST DOCD IN RCRD: CPT | Mod: CPTII,S$GLB,, | Performed by: STUDENT IN AN ORGANIZED HEALTH CARE EDUCATION/TRAINING PROGRAM

## 2023-08-23 PROCEDURE — 3044F PR MOST RECENT HEMOGLOBIN A1C LEVEL <7.0%: ICD-10-PCS | Mod: CPTII,S$GLB,, | Performed by: STUDENT IN AN ORGANIZED HEALTH CARE EDUCATION/TRAINING PROGRAM

## 2023-08-23 PROCEDURE — 3008F PR BODY MASS INDEX (BMI) DOCUMENTED: ICD-10-PCS | Mod: CPTII,S$GLB,, | Performed by: STUDENT IN AN ORGANIZED HEALTH CARE EDUCATION/TRAINING PROGRAM

## 2023-08-23 PROCEDURE — 3008F BODY MASS INDEX DOCD: CPT | Mod: CPTII,S$GLB,, | Performed by: STUDENT IN AN ORGANIZED HEALTH CARE EDUCATION/TRAINING PROGRAM

## 2023-08-23 PROCEDURE — 3078F PR MOST RECENT DIASTOLIC BLOOD PRESSURE < 80 MM HG: ICD-10-PCS | Mod: CPTII,S$GLB,, | Performed by: STUDENT IN AN ORGANIZED HEALTH CARE EDUCATION/TRAINING PROGRAM

## 2023-08-23 NOTE — PROGRESS NOTES
"Subjective:      Patient ID: Gordon Dewitt is a 63 y.o. male.    Chief Complaint: Disease Management    Gordon Dewitt is a 62yo M with PMH of CAD s/p stenting, HFrEF, HLD, seasonal allergies, and chronic cough. Pt was referred to Rheumatology for evaluation and workup of persistent oral aphthous ulcers and an elevated IgG4 level.    Pt describes multiple painful oral ulcers, which initially presented in mid to late 5/2023, about 1-2 mos after starting some ICS/LABA inhalers for cough, dyspnea, and wheezing symptoms. The ulcers were persistent for some time and not improving significantly with initial changing to MDI and trying some mouthwashes. However, since consistently using the magic mouthwash over the past couple of weeks, the ulcers have mostly resolved. There are still a few, but they are healing and significantly better compared to previously. He was in a lot of pain a couple of weeks ago with the numerous ulcers. Prior to these past couple months, he was not having frequent oral or nasal ulcers. Many years ago, he used to get oral ulcers maybe 1-2x/yr.    Over the past few days, pt also started to notice a few acneiform pustules/lesions on his face, which he normally does not get. There are 4 of them, one of which has "popped."    He was evaluated by pulmonology and allergy for the respiratory symptoms. There was no evidence of obstructive or restrictive disease on PFTs and workup. He was initiated on maintenance inhalers, which have been helping. No specific allergens were identified on initial testing. However, pt has noticed that his respiratory symptoms seem to be worse the day after work exposure (pt works with AC units and installation, is in attics frequently, and exposed to dust/fiberglass/heat/etc.).     Pt denies any fevers, chills, weight loss, fatigue, vision changes, painful or red eyes, nausea, vomiting, abdominal pain, diarrhea, melena, BRBPR, urinary changes, joint stiffness/swelling/pain, " "or other skin rashes/changes other than as detailed above.     No prior hx of VTE events. No personal or family history of known diagnosed autoimmune or connective tissue disease. Pt does report some family history of oral ulcers in his siblings.         Review of Systems   Constitutional:  Negative for activity change, appetite change, chills, fatigue and fever.        Lost approximately 10lbs last year after cardiac issues and starting multiple new meds.   HENT:  Positive for mouth sores (per HPI). Negative for congestion, dental problem, facial swelling, nosebleeds, rhinorrhea, sinus pain, sore throat and trouble swallowing.    Eyes:  Negative for photophobia, pain, discharge, redness and visual disturbance.   Respiratory:  Negative for chest tightness and wheezing.         Cough and dyspnea improving with inhaler use   Cardiovascular:  Negative for chest pain and leg swelling.   Gastrointestinal:  Negative for abdominal pain, blood in stool, constipation, diarrhea, nausea and vomiting.   Endocrine: Negative for cold intolerance and heat intolerance.   Genitourinary:  Negative for dysuria, frequency, genital sores, hematuria and urgency.   Musculoskeletal:  Negative for arthralgias, back pain, gait problem, joint swelling, myalgias, neck pain and neck stiffness.   Skin:  Negative for color change, rash and wound.   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   Hematological:  Negative for adenopathy.        Occasionally bruises in setting of anti platelet med    Psychiatric/Behavioral:  Negative for agitation, confusion and sleep disturbance. The patient is not nervous/anxious.       Objective:   /69   Pulse (!) 55   Ht 5' 4.02" (1.626 m)   Wt 75.8 kg (167 lb 1.7 oz)   BMI 28.67 kg/m²   Physical Exam "   Constitutional: He is oriented to person, place, and time. He appears well-developed and well-nourished. No distress.   HENT:   Head: Normocephalic and atraumatic.   Right Ear: Tympanic membrane and external ear normal.   Left Ear: Tympanic membrane and external ear normal.   Nose: Nose normal. No nasal congestion.   Mouth/Throat: Mucous membranes are moist.   Nose: No nasal ulcers visible.  Mouth/Throat: 4-6 healing aphthous ulcers visible. No erythema, thrush, other wounds, drainage.  Eyes: Pupils are equal, round, and reactive to light. Conjunctivae are normal. Right eye exhibits no discharge. Left eye exhibits no discharge.   Cardiovascular: Normal rate, regular rhythm and normal heart sounds.   No murmur heard.  Pulmonary/Chest: Effort normal and breath sounds normal. No respiratory distress. He has no wheezes.   Abdominal: Soft. Bowel sounds are normal. He exhibits no distension. There is no abdominal tenderness.   Musculoskeletal:         General: No swelling, tenderness, deformity or signs of injury. Normal range of motion.      Cervical back: Normal range of motion and neck supple.      Comments: [facility]. No acute synovitis, erythema, or tenderness of any of the small or large joints.   Lymphadenopathy:     He has no cervical adenopathy.   Neurological: He is alert and oriented to person, place, and time. He displays no weakness. Coordination and gait normal.   Skin: Skin is warm and dry. No rash noted.   4 acneiform lesions on the face - 1 on forehead, 1 on right cheek, 2 on left cheek   Psychiatric: His behavior is normal. Mood normal.   Vitals reviewed.         8/23/2023   Tender (JI-28) 0 / 28    Swollen (JI-28) 0 / 28    Provider Global --   Patient Global --   ESR --   CRP --   JI-28 (ESR) --   JI-28 (CRP) --   CDAI Score --     Assessment:     1. Aphthous ulcer    2. Increased immunoglobulin    3. Recurrent bronchospasm    4. Cough due to bronchospasm      Plan:      Problem List Items Addressed This Visit          Pulmonary    Recurrent bronchospasm    Cough due to bronchospasm     Other Visit Diagnoses       Aphthous ulcer    -  Primary    Increased immunoglobulin              Assessment/Plan:    Aphthous ulcers  - Pt with painful oral ulcers for a couple of months, after initiation of steroid inhalers  - Has now improved / almost resolved with magic mouthwash  - No other significant associated signs or symptoms at this time  - Behcet's syndrome can present with recurrent oral ulcers; there are no other signs of Behcets like uveitis, skin changes, vascular abnormalities, prior VTE events  - Would just continue to monitor for now and see if there is any clinical change    Increased immunoglobulin  - Pt showed prior lab value of IgG4 level in the 250s as an image on his phone (lab result with cut offs not available in pt's chart)  - This is likely an elevated value if similar to our lab cut off  - However, there has been no other concerning imaging or diagnostics indicative of IgG4RD  - Primarily respiratory symptoms and oral ulcers would not be a common presentation of IgG4RD  - At this time, we would not pursue additional workup unless there are significant clinical changes   - Additional workup by allergist could be considered if appropriate    Pt can continue to follow up with PCP. Rheumatology is available if needed.    Assessment and plan discussed with supervising attending, Dr. Man.      Jasmin Mcgraw MD  PGY-4, Rheumatology

## 2023-08-23 NOTE — PROGRESS NOTES
I have personally taken the history and examined the patient to verify the fellow's notation, and I agree with the impression and plan stated.      He has had oral aphthous ulcers without genital ulcers and with no other symptoms of Behcet or systemic inflammatory rheumatic disease  We discussed this with him and recommended no further workup but he will let us know if other symptoms emerge.

## 2023-09-08 ENCOUNTER — PATIENT MESSAGE (OUTPATIENT)
Dept: ADMINISTRATIVE | Facility: HOSPITAL | Age: 63
End: 2023-09-08
Payer: COMMERCIAL

## 2023-10-12 ENCOUNTER — HOSPITAL ENCOUNTER (OUTPATIENT)
Facility: HOSPITAL | Age: 63
Discharge: HOME | End: 2023-10-13
Attending: EMERGENCY MEDICINE | Admitting: HOSPITALIST
Payer: COMMERCIAL

## 2023-10-12 DIAGNOSIS — J20.9 BRONCHITIS, ACUTE, WITH BRONCHOSPASM: ICD-10-CM

## 2023-10-12 DIAGNOSIS — J98.01 COUGH DUE TO BRONCHOSPASM: ICD-10-CM

## 2023-10-12 DIAGNOSIS — Z86.79 HISTORY OF CHF (CONGESTIVE HEART FAILURE): ICD-10-CM

## 2023-10-12 DIAGNOSIS — R06.02 SOB (SHORTNESS OF BREATH): ICD-10-CM

## 2023-10-12 DIAGNOSIS — J98.09 RECURRENT BRONCHOSPASM: ICD-10-CM

## 2023-10-12 DIAGNOSIS — R06.02 SHORTNESS OF BREATH: ICD-10-CM

## 2023-10-12 DIAGNOSIS — R07.9 CHEST PAIN: ICD-10-CM

## 2023-10-12 DIAGNOSIS — J45.901 EXACERBATION OF ASTHMA, UNSPECIFIED ASTHMA SEVERITY, UNSPECIFIED WHETHER PERSISTENT: Primary | ICD-10-CM

## 2023-10-12 LAB
ALBUMIN SERPL BCP-MCNC: 3.7 G/DL (ref 3.5–5.2)
ALLENS TEST: ABNORMAL
ALLENS TEST: ABNORMAL
ALP SERPL-CCNC: 92 U/L (ref 55–135)
ALT SERPL W/O P-5'-P-CCNC: 17 U/L (ref 10–44)
ANION GAP SERPL CALC-SCNC: 12 MMOL/L (ref 8–16)
AST SERPL-CCNC: 16 U/L (ref 10–40)
BASOPHILS # BLD AUTO: 0.06 K/UL (ref 0–0.2)
BASOPHILS NFR BLD: 0.7 % (ref 0–1.9)
BILIRUB SERPL-MCNC: 0.3 MG/DL (ref 0.1–1)
BNP SERPL-MCNC: 78 PG/ML (ref 0–99)
BUN SERPL-MCNC: 15 MG/DL (ref 8–23)
CALCIUM SERPL-MCNC: 9 MG/DL (ref 8.7–10.5)
CHLORIDE SERPL-SCNC: 104 MMOL/L (ref 95–110)
CO2 SERPL-SCNC: 23 MMOL/L (ref 23–29)
CREAT SERPL-MCNC: 1 MG/DL (ref 0.5–1.4)
DIFFERENTIAL METHOD: ABNORMAL
EOSINOPHIL # BLD AUTO: 0.1 K/UL (ref 0–0.5)
EOSINOPHIL NFR BLD: 1.4 % (ref 0–8)
ERYTHROCYTE [DISTWIDTH] IN BLOOD BY AUTOMATED COUNT: 16.9 % (ref 11.5–14.5)
EST. GFR  (NO RACE VARIABLE): >60 ML/MIN/1.73 M^2
GLUCOSE SERPL-MCNC: 136 MG/DL (ref 70–110)
HCO3 UR-SCNC: 21.4 MMOL/L (ref 24–28)
HCO3 UR-SCNC: 26.5 MMOL/L (ref 17–27)
HCT VFR BLD AUTO: 42.5 % (ref 40–54)
HGB BLD-MCNC: 12.8 G/DL (ref 14–18)
IMM GRANULOCYTES # BLD AUTO: 0.05 K/UL (ref 0–0.04)
IMM GRANULOCYTES NFR BLD AUTO: 0.5 % (ref 0–0.5)
LYMPHOCYTES # BLD AUTO: 0.7 K/UL (ref 1–4.8)
LYMPHOCYTES NFR BLD: 7.3 % (ref 18–48)
MCH RBC QN AUTO: 23 PG (ref 27–31)
MCHC RBC AUTO-ENTMCNC: 30.1 G/DL (ref 32–36)
MCV RBC AUTO: 76 FL (ref 82–98)
MONOCYTES # BLD AUTO: 0.6 K/UL (ref 0.3–1)
MONOCYTES NFR BLD: 6.7 % (ref 4–15)
NEUTROPHILS # BLD AUTO: 7.6 K/UL (ref 1.8–7.7)
NEUTROPHILS NFR BLD: 83.4 % (ref 38–73)
NRBC BLD-RTO: 0 /100 WBC
PCO2 BLDA: 46.3 MMHG (ref 35–45)
PCO2 BLDA: 47.4 MMHG (ref 32–66)
PH SMN: 7.27 [PH] (ref 7.35–7.45)
PH SMN: 7.36 [PH] (ref 7.18–7.38)
PLATELET # BLD AUTO: 263 K/UL (ref 150–450)
PMV BLD AUTO: 10.9 FL (ref 9.2–12.9)
PO2 BLDA: 31 MMHG (ref 6–31)
PO2 BLDA: 97 MMHG (ref 40–60)
POC BE: -6 MMOL/L
POC BE: 1 MMOL/L
POC SATURATED O2: 55 %
POC SATURATED O2: 96 % (ref 95–100)
POC TCO2: 23 MMOL/L (ref 24–29)
POC TCO2: 28 MMOL/L (ref 23–27)
POTASSIUM SERPL-SCNC: 3.5 MMOL/L (ref 3.5–5.1)
PROT SERPL-MCNC: 7.3 G/DL (ref 6–8.4)
RBC # BLD AUTO: 5.57 M/UL (ref 4.6–6.2)
RSV AG SPEC QL IA: NEGATIVE
SAMPLE: ABNORMAL
SAMPLE: ABNORMAL
SARS-COV-2 RDRP RESP QL NAA+PROBE: NEGATIVE
SITE: ABNORMAL
SITE: ABNORMAL
SODIUM SERPL-SCNC: 139 MMOL/L (ref 136–145)
SPECIMEN SOURCE: NORMAL
TROPONIN I SERPL DL<=0.01 NG/ML-MCNC: <0.006 NG/ML (ref 0–0.03)
WBC # BLD AUTO: 9.12 K/UL (ref 3.9–12.7)

## 2023-10-12 PROCEDURE — 63600175 PHARM REV CODE 636 W HCPCS

## 2023-10-12 PROCEDURE — 93010 EKG 12-LEAD: ICD-10-PCS | Mod: ,,, | Performed by: INTERNAL MEDICINE

## 2023-10-12 PROCEDURE — 99285 EMERGENCY DEPT VISIT HI MDM: CPT | Mod: 25

## 2023-10-12 PROCEDURE — 99223 1ST HOSP IP/OBS HIGH 75: CPT | Mod: ,,, | Performed by: NURSE PRACTITIONER

## 2023-10-12 PROCEDURE — 85025 COMPLETE CBC W/AUTO DIFF WBC: CPT | Performed by: PHYSICIAN ASSISTANT

## 2023-10-12 PROCEDURE — U0002 COVID-19 LAB TEST NON-CDC: HCPCS | Performed by: EMERGENCY MEDICINE

## 2023-10-12 PROCEDURE — 99900035 HC TECH TIME PER 15 MIN (STAT)

## 2023-10-12 PROCEDURE — 12000002 HC ACUTE/MED SURGE SEMI-PRIVATE ROOM

## 2023-10-12 PROCEDURE — 84145 PROCALCITONIN (PCT): CPT | Performed by: NURSE PRACTITIONER

## 2023-10-12 PROCEDURE — 25000003 PHARM REV CODE 250: Performed by: NURSE PRACTITIONER

## 2023-10-12 PROCEDURE — 96365 THER/PROPH/DIAG IV INF INIT: CPT

## 2023-10-12 PROCEDURE — G0378 HOSPITAL OBSERVATION PER HR: HCPCS

## 2023-10-12 PROCEDURE — 93010 ELECTROCARDIOGRAM REPORT: CPT | Mod: ,,, | Performed by: INTERNAL MEDICINE

## 2023-10-12 PROCEDURE — 25000003 PHARM REV CODE 250

## 2023-10-12 PROCEDURE — 94761 N-INVAS EAR/PLS OXIMETRY MLT: CPT

## 2023-10-12 PROCEDURE — 87502 INFLUENZA DNA AMP PROBE: CPT | Performed by: NURSE PRACTITIONER

## 2023-10-12 PROCEDURE — 25000242 PHARM REV CODE 250 ALT 637 W/ HCPCS

## 2023-10-12 PROCEDURE — 93005 ELECTROCARDIOGRAM TRACING: CPT

## 2023-10-12 PROCEDURE — 63600175 PHARM REV CODE 636 W HCPCS: Performed by: EMERGENCY MEDICINE

## 2023-10-12 PROCEDURE — 83880 ASSAY OF NATRIURETIC PEPTIDE: CPT | Performed by: PHYSICIAN ASSISTANT

## 2023-10-12 PROCEDURE — 87502 INFLUENZA DNA AMP PROBE: CPT

## 2023-10-12 PROCEDURE — 80053 COMPREHEN METABOLIC PANEL: CPT | Performed by: EMERGENCY MEDICINE

## 2023-10-12 PROCEDURE — 87634 RSV DNA/RNA AMP PROBE: CPT | Performed by: NURSE PRACTITIONER

## 2023-10-12 PROCEDURE — 96367 TX/PROPH/DG ADDL SEQ IV INF: CPT

## 2023-10-12 PROCEDURE — 96375 TX/PRO/DX INJ NEW DRUG ADDON: CPT

## 2023-10-12 PROCEDURE — 84484 ASSAY OF TROPONIN QUANT: CPT | Performed by: PHYSICIAN ASSISTANT

## 2023-10-12 PROCEDURE — 94640 AIRWAY INHALATION TREATMENT: CPT | Mod: XB

## 2023-10-12 PROCEDURE — 82803 BLOOD GASES ANY COMBINATION: CPT

## 2023-10-12 PROCEDURE — 99223 PR INITIAL HOSPITAL CARE,LEVL III: ICD-10-PCS | Mod: ,,, | Performed by: NURSE PRACTITIONER

## 2023-10-12 RX ORDER — PREDNISONE 20 MG/1
40 TABLET ORAL DAILY
Status: DISCONTINUED | OUTPATIENT
Start: 2023-10-13 | End: 2023-10-13 | Stop reason: HOSPADM

## 2023-10-12 RX ORDER — IPRATROPIUM BROMIDE AND ALBUTEROL SULFATE 2.5; .5 MG/3ML; MG/3ML
3 SOLUTION RESPIRATORY (INHALATION)
Status: COMPLETED | OUTPATIENT
Start: 2023-10-12 | End: 2023-10-12

## 2023-10-12 RX ORDER — METHYLPREDNISOLONE SOD SUCC 125 MG
125 VIAL (EA) INJECTION
Status: COMPLETED | OUTPATIENT
Start: 2023-10-12 | End: 2023-10-12

## 2023-10-12 RX ORDER — NAPROXEN SODIUM 220 MG/1
81 TABLET, FILM COATED ORAL DAILY
Status: DISCONTINUED | OUTPATIENT
Start: 2023-10-13 | End: 2023-10-13 | Stop reason: HOSPADM

## 2023-10-12 RX ORDER — AZELASTINE 1 MG/ML
1 SPRAY, METERED NASAL 2 TIMES DAILY PRN
Status: DISCONTINUED | OUTPATIENT
Start: 2023-10-12 | End: 2023-10-13 | Stop reason: HOSPADM

## 2023-10-12 RX ORDER — CETIRIZINE HYDROCHLORIDE 5 MG/1
5 TABLET ORAL NIGHTLY
Status: DISCONTINUED | OUTPATIENT
Start: 2023-10-12 | End: 2023-10-13 | Stop reason: HOSPADM

## 2023-10-12 RX ORDER — IPRATROPIUM BROMIDE AND ALBUTEROL SULFATE 2.5; .5 MG/3ML; MG/3ML
3 SOLUTION RESPIRATORY (INHALATION)
Status: DISCONTINUED | OUTPATIENT
Start: 2023-10-13 | End: 2023-10-13 | Stop reason: HOSPADM

## 2023-10-12 RX ORDER — MONTELUKAST SODIUM 10 MG/1
10 TABLET ORAL DAILY
Status: DISCONTINUED | OUTPATIENT
Start: 2023-10-13 | End: 2023-10-13 | Stop reason: HOSPADM

## 2023-10-12 RX ORDER — IPRATROPIUM BROMIDE AND ALBUTEROL SULFATE 2.5; .5 MG/3ML; MG/3ML
3 SOLUTION RESPIRATORY (INHALATION) EVERY 4 HOURS PRN
Status: DISCONTINUED | OUTPATIENT
Start: 2023-10-12 | End: 2023-10-13 | Stop reason: HOSPADM

## 2023-10-12 RX ORDER — ATORVASTATIN CALCIUM 40 MG/1
40 TABLET, FILM COATED ORAL NIGHTLY
Status: DISCONTINUED | OUTPATIENT
Start: 2023-10-12 | End: 2023-10-13 | Stop reason: HOSPADM

## 2023-10-12 RX ADMIN — AZITHROMYCIN MONOHYDRATE 500 MG: 500 INJECTION, POWDER, LYOPHILIZED, FOR SOLUTION INTRAVENOUS at 10:10

## 2023-10-12 RX ADMIN — IPRATROPIUM BROMIDE AND ALBUTEROL SULFATE 3 ML: .5; 3 SOLUTION RESPIRATORY (INHALATION) at 07:10

## 2023-10-12 RX ADMIN — METHYLPREDNISOLONE SODIUM SUCCINATE 125 MG: 125 INJECTION, POWDER, FOR SOLUTION INTRAMUSCULAR; INTRAVENOUS at 07:10

## 2023-10-12 RX ADMIN — CEFTRIAXONE 1 G: 1 INJECTION, POWDER, FOR SOLUTION INTRAMUSCULAR; INTRAVENOUS at 11:10

## 2023-10-12 RX ADMIN — CETIRIZINE HYDROCHLORIDE 5 MG: 5 TABLET, FILM COATED ORAL at 11:10

## 2023-10-12 RX ADMIN — ATORVASTATIN CALCIUM 40 MG: 40 TABLET, FILM COATED ORAL at 11:10

## 2023-10-12 NOTE — ED NOTES
Pt connected to cardiac monitor, bp cuff cycling q15 minutes, and continuous pulse ox. Pt placed in hospital gown. Son at bedside. Bed low and locked, side rails up x2, call light in reach.

## 2023-10-12 NOTE — ED TRIAGE NOTES
Pt presents to ED with c/o SOB starting x2 days ago. Pt states he took inhaler and nebulizer at home with no relief. Pt was seen here a few months ago for same thing. Pt endorses CP with activity 3/10 mid sternal pressure.

## 2023-10-13 VITALS
HEIGHT: 64 IN | OXYGEN SATURATION: 93 % | RESPIRATION RATE: 18 BRPM | TEMPERATURE: 98 F | HEART RATE: 70 BPM | SYSTOLIC BLOOD PRESSURE: 111 MMHG | DIASTOLIC BLOOD PRESSURE: 62 MMHG | WEIGHT: 167 LBS | BODY MASS INDEX: 28.51 KG/M2

## 2023-10-13 PROBLEM — D50.9 MICROCYTIC ANEMIA: Status: ACTIVE | Noted: 2023-10-13

## 2023-10-13 LAB
ALBUMIN SERPL BCP-MCNC: 3.5 G/DL (ref 3.5–5.2)
ALLENS TEST: ABNORMAL
ALP SERPL-CCNC: 91 U/L (ref 55–135)
ALT SERPL W/O P-5'-P-CCNC: 16 U/L (ref 10–44)
ANION GAP SERPL CALC-SCNC: 10 MMOL/L (ref 8–16)
ASCENDING AORTA: 3.01 CM
AST SERPL-CCNC: 14 U/L (ref 10–40)
AV INDEX (PROSTH): 1.12
AV MEAN GRADIENT: 3 MMHG
AV PEAK GRADIENT: 4 MMHG
AV VALVE AREA BY VELOCITY RATIO: 3.13 CM²
AV VALVE AREA: 3.42 CM²
AV VELOCITY RATIO: 1.03
BASOPHILS # BLD AUTO: 0.01 K/UL (ref 0–0.2)
BASOPHILS NFR BLD: 0.2 % (ref 0–1.9)
BILIRUB SERPL-MCNC: 0.3 MG/DL (ref 0.1–1)
BSA FOR ECHO PROCEDURE: 1.85 M2
BUN SERPL-MCNC: 15 MG/DL (ref 8–23)
CALCIUM SERPL-MCNC: 9.2 MG/DL (ref 8.7–10.5)
CHLORIDE SERPL-SCNC: 103 MMOL/L (ref 95–110)
CO2 SERPL-SCNC: 24 MMOL/L (ref 23–29)
CREAT SERPL-MCNC: 0.9 MG/DL (ref 0.5–1.4)
CV ECHO LV RWT: 0.27 CM
DIFFERENTIAL METHOD: ABNORMAL
DOP CALC AO PEAK VEL: 1.06 M/S
DOP CALC AO VTI: 19.64 CM
DOP CALC LVOT AREA: 3 CM2
DOP CALC LVOT DIAMETER: 1.97 CM
DOP CALC LVOT PEAK VEL: 1.09 M/S
DOP CALC LVOT STROKE VOLUME: 67.18 CM3
DOP CALCLVOT PEAK VEL VTI: 22.05 CM
E WAVE DECELERATION TIME: 272.05 MSEC
E/A RATIO: 0.56
E/E' RATIO: 6.53 M/S
ECHO LV POSTERIOR WALL: 0.74 CM (ref 0.6–1.1)
EJECTION FRACTION: 45 %
EOSINOPHIL # BLD AUTO: 0 K/UL (ref 0–0.5)
EOSINOPHIL NFR BLD: 0.2 % (ref 0–8)
ERYTHROCYTE [DISTWIDTH] IN BLOOD BY AUTOMATED COUNT: 16.5 % (ref 11.5–14.5)
EST. GFR  (NO RACE VARIABLE): >60 ML/MIN/1.73 M^2
FERRITIN SERPL-MCNC: 23 NG/ML (ref 20–300)
FRACTIONAL SHORTENING: 13 % (ref 28–44)
GLUCOSE SERPL-MCNC: 129 MG/DL (ref 70–110)
HCO3 UR-SCNC: 21.6 MMOL/L (ref 24–28)
HCT VFR BLD AUTO: 40.6 % (ref 40–54)
HGB BLD-MCNC: 11.9 G/DL (ref 14–18)
IMM GRANULOCYTES # BLD AUTO: 0.03 K/UL (ref 0–0.04)
IMM GRANULOCYTES NFR BLD AUTO: 0.7 % (ref 0–0.5)
INFLUENZA A, MOLECULAR: NEGATIVE
INFLUENZA B, MOLECULAR: NEGATIVE
INTERVENTRICULAR SEPTUM: 0.68 CM (ref 0.6–1.1)
IRON SERPL-MCNC: 26 UG/DL (ref 45–160)
LA MAJOR: 4.89 CM
LA MINOR: 5.01 CM
LA WIDTH: 3.75 CM
LEFT ATRIUM SIZE: 3.55 CM
LEFT ATRIUM VOLUME INDEX MOD: 26.5 ML/M2
LEFT ATRIUM VOLUME INDEX: 30.9 ML/M2
LEFT ATRIUM VOLUME MOD: 47.93 CM3
LEFT ATRIUM VOLUME: 56 CM3
LEFT INTERNAL DIMENSION IN SYSTOLE: 4.72 CM (ref 2.1–4)
LEFT VENTRICLE DIASTOLIC VOLUME INDEX: 78.02 ML/M2
LEFT VENTRICLE DIASTOLIC VOLUME: 141.22 ML
LEFT VENTRICLE MASS INDEX: 74 G/M2
LEFT VENTRICLE SYSTOLIC VOLUME INDEX: 57 ML/M2
LEFT VENTRICLE SYSTOLIC VOLUME: 103.2 ML
LEFT VENTRICULAR INTERNAL DIMENSION IN DIASTOLE: 5.4 CM (ref 3.5–6)
LEFT VENTRICULAR MASS: 133.51 G
LV LATERAL E/E' RATIO: 4.77 M/S
LV SEPTAL E/E' RATIO: 10.33 M/S
LYMPHOCYTES # BLD AUTO: 0.7 K/UL (ref 1–4.8)
LYMPHOCYTES NFR BLD: 17.2 % (ref 18–48)
MAGNESIUM SERPL-MCNC: 2.3 MG/DL (ref 1.6–2.6)
MCH RBC QN AUTO: 22.7 PG (ref 27–31)
MCHC RBC AUTO-ENTMCNC: 29.3 G/DL (ref 32–36)
MCV RBC AUTO: 78 FL (ref 82–98)
MONOCYTES # BLD AUTO: 0.1 K/UL (ref 0.3–1)
MONOCYTES NFR BLD: 2.9 % (ref 4–15)
MV A" WAVE DURATION": 8.56 MSEC
MV PEAK A VEL: 1.11 M/S
MV PEAK E VEL: 0.62 M/S
MV STENOSIS PRESSURE HALF TIME: 78.9 MS
MV VALVE AREA P 1/2 METHOD: 2.79 CM2
NEUTROPHILS # BLD AUTO: 3.2 K/UL (ref 1.8–7.7)
NEUTROPHILS NFR BLD: 78.8 % (ref 38–73)
NRBC BLD-RTO: 0 /100 WBC
PCO2 BLDA: 46.1 MMHG (ref 35–45)
PH SMN: 7.28 [PH] (ref 7.35–7.45)
PLATELET # BLD AUTO: 299 K/UL (ref 150–450)
PMV BLD AUTO: 11.8 FL (ref 9.2–12.9)
PO2 BLDA: 87 MMHG (ref 40–60)
POC BE: -5 MMOL/L
POC SATURATED O2: 95 % (ref 95–100)
POC TCO2: 23 MMOL/L (ref 24–29)
POTASSIUM SERPL-SCNC: 4.6 MMOL/L (ref 3.5–5.1)
PROCALCITONIN SERPL IA-MCNC: 0.04 NG/ML
PROT SERPL-MCNC: 7.1 G/DL (ref 6–8.4)
PULM VEIN S/D RATIO: 1.74
PV PEAK D VEL: 0.43 M/S
PV PEAK S VEL: 0.75 M/S
RA MAJOR: 4.86 CM
RA PRESSURE ESTIMATED: 3 MMHG
RA WIDTH: 4.88 CM
RBC # BLD AUTO: 5.24 M/UL (ref 4.6–6.2)
RIGHT VENTRICULAR END-DIASTOLIC DIMENSION: 3.58 CM
SAMPLE: ABNORMAL
SATURATED IRON: 6 % (ref 20–50)
SINUS: 3.7 CM
SITE: ABNORMAL
SODIUM SERPL-SCNC: 137 MMOL/L (ref 136–145)
SPECIMEN SOURCE: NORMAL
STJ: 3.17 CM
TDI LATERAL: 0.13 M/S
TDI SEPTAL: 0.06 M/S
TDI: 0.1 M/S
TOTAL IRON BINDING CAPACITY: 431 UG/DL (ref 250–450)
TRANSFERRIN SERPL-MCNC: 291 MG/DL (ref 200–375)
TRICUSPID ANNULAR PLANE SYSTOLIC EXCURSION: 2.94 CM
WBC # BLD AUTO: 4.08 K/UL (ref 3.9–12.7)
Z-SCORE OF LEFT VENTRICULAR DIMENSION IN END DIASTOLE: 0.75
Z-SCORE OF LEFT VENTRICULAR DIMENSION IN END SYSTOLE: 3.34

## 2023-10-13 PROCEDURE — 87205 SMEAR GRAM STAIN: CPT | Performed by: NURSE PRACTITIONER

## 2023-10-13 PROCEDURE — 27000221 HC OXYGEN, UP TO 24 HOURS

## 2023-10-13 PROCEDURE — 25000242 PHARM REV CODE 250 ALT 637 W/ HCPCS: Performed by: NURSE PRACTITIONER

## 2023-10-13 PROCEDURE — 94640 AIRWAY INHALATION TREATMENT: CPT

## 2023-10-13 PROCEDURE — 85025 COMPLETE CBC W/AUTO DIFF WBC: CPT | Performed by: NURSE PRACTITIONER

## 2023-10-13 PROCEDURE — G0378 HOSPITAL OBSERVATION PER HR: HCPCS

## 2023-10-13 PROCEDURE — 99900035 HC TECH TIME PER 15 MIN (STAT)

## 2023-10-13 PROCEDURE — 94640 AIRWAY INHALATION TREATMENT: CPT | Mod: XB

## 2023-10-13 PROCEDURE — 80053 COMPREHEN METABOLIC PANEL: CPT | Performed by: NURSE PRACTITIONER

## 2023-10-13 PROCEDURE — 82728 ASSAY OF FERRITIN: CPT | Performed by: NURSE PRACTITIONER

## 2023-10-13 PROCEDURE — 83540 ASSAY OF IRON: CPT | Performed by: NURSE PRACTITIONER

## 2023-10-13 PROCEDURE — 63700000 PHARM REV CODE 250 ALT 637 W/O HCPCS: Performed by: STUDENT IN AN ORGANIZED HEALTH CARE EDUCATION/TRAINING PROGRAM

## 2023-10-13 PROCEDURE — 63600175 PHARM REV CODE 636 W HCPCS: Performed by: NURSE PRACTITIONER

## 2023-10-13 PROCEDURE — 25500020 PHARM REV CODE 255: Performed by: INTERNAL MEDICINE

## 2023-10-13 PROCEDURE — 83735 ASSAY OF MAGNESIUM: CPT | Performed by: NURSE PRACTITIONER

## 2023-10-13 PROCEDURE — 94761 N-INVAS EAR/PLS OXIMETRY MLT: CPT

## 2023-10-13 PROCEDURE — 84466 ASSAY OF TRANSFERRIN: CPT | Performed by: NURSE PRACTITIONER

## 2023-10-13 PROCEDURE — 25000003 PHARM REV CODE 250: Performed by: NURSE PRACTITIONER

## 2023-10-13 PROCEDURE — 87070 CULTURE OTHR SPECIMN AEROBIC: CPT | Performed by: NURSE PRACTITIONER

## 2023-10-13 RX ORDER — IBUPROFEN 200 MG
16 TABLET ORAL
Status: DISCONTINUED | OUTPATIENT
Start: 2023-10-13 | End: 2023-10-13 | Stop reason: HOSPADM

## 2023-10-13 RX ORDER — IBUPROFEN 200 MG
24 TABLET ORAL
Status: DISCONTINUED | OUTPATIENT
Start: 2023-10-13 | End: 2023-10-13 | Stop reason: HOSPADM

## 2023-10-13 RX ORDER — GUAIFENESIN 600 MG/1
600 TABLET, EXTENDED RELEASE ORAL 2 TIMES DAILY
Qty: 20 TABLET | Refills: 0 | Status: SHIPPED | OUTPATIENT
Start: 2023-10-13 | End: 2023-10-23

## 2023-10-13 RX ORDER — NALOXONE HCL 0.4 MG/ML
0.02 VIAL (ML) INJECTION
Status: DISCONTINUED | OUTPATIENT
Start: 2023-10-13 | End: 2023-10-13 | Stop reason: HOSPADM

## 2023-10-13 RX ORDER — GUAIFENESIN 600 MG/1
600 TABLET, EXTENDED RELEASE ORAL 2 TIMES DAILY
Status: DISCONTINUED | OUTPATIENT
Start: 2023-10-13 | End: 2023-10-13 | Stop reason: HOSPADM

## 2023-10-13 RX ORDER — FLUTICASONE FUROATE AND VILANTEROL 100; 25 UG/1; UG/1
1 POWDER RESPIRATORY (INHALATION) 2 TIMES DAILY
Status: DISCONTINUED | OUTPATIENT
Start: 2023-10-13 | End: 2023-10-13 | Stop reason: HOSPADM

## 2023-10-13 RX ORDER — ONDANSETRON 2 MG/ML
4 INJECTION INTRAMUSCULAR; INTRAVENOUS EVERY 8 HOURS PRN
Status: DISCONTINUED | OUTPATIENT
Start: 2023-10-13 | End: 2023-10-13 | Stop reason: HOSPADM

## 2023-10-13 RX ORDER — TALC
6 POWDER (GRAM) TOPICAL NIGHTLY PRN
Status: DISCONTINUED | OUTPATIENT
Start: 2023-10-13 | End: 2023-10-13 | Stop reason: HOSPADM

## 2023-10-13 RX ORDER — PREDNISONE 20 MG/1
40 TABLET ORAL DAILY
Qty: 10 TABLET | Refills: 0 | Status: SHIPPED | OUTPATIENT
Start: 2023-10-14 | End: 2023-10-19

## 2023-10-13 RX ORDER — GLUCAGON 1 MG
1 KIT INJECTION
Status: DISCONTINUED | OUTPATIENT
Start: 2023-10-13 | End: 2023-10-13 | Stop reason: HOSPADM

## 2023-10-13 RX ORDER — NITROGLYCERIN 0.4 MG/1
0.4 TABLET SUBLINGUAL EVERY 5 MIN PRN
Status: DISCONTINUED | OUTPATIENT
Start: 2023-10-13 | End: 2023-10-13 | Stop reason: HOSPADM

## 2023-10-13 RX ORDER — ACETAMINOPHEN 500 MG
1000 TABLET ORAL EVERY 8 HOURS PRN
Status: DISCONTINUED | OUTPATIENT
Start: 2023-10-13 | End: 2023-10-13 | Stop reason: HOSPADM

## 2023-10-13 RX ORDER — SODIUM CHLORIDE 0.9 % (FLUSH) 0.9 %
10 SYRINGE (ML) INJECTION EVERY 12 HOURS PRN
Status: DISCONTINUED | OUTPATIENT
Start: 2023-10-13 | End: 2023-10-13 | Stop reason: HOSPADM

## 2023-10-13 RX ORDER — FLUTICASONE PROPIONATE 50 MCG
1 SPRAY, SUSPENSION (ML) NASAL DAILY
Status: DISCONTINUED | OUTPATIENT
Start: 2023-10-13 | End: 2023-10-13 | Stop reason: HOSPADM

## 2023-10-13 RX ORDER — AZITHROMYCIN 250 MG/1
250 TABLET, FILM COATED ORAL DAILY
Status: DISCONTINUED | OUTPATIENT
Start: 2023-10-13 | End: 2023-10-13 | Stop reason: HOSPADM

## 2023-10-13 RX ORDER — ACETAMINOPHEN 325 MG/1
650 TABLET ORAL EVERY 6 HOURS PRN
Status: DISCONTINUED | OUTPATIENT
Start: 2023-10-13 | End: 2023-10-13 | Stop reason: HOSPADM

## 2023-10-13 RX ADMIN — SACUBITRIL AND VALSARTAN 1 TABLET: 24; 26 TABLET, FILM COATED ORAL at 09:10

## 2023-10-13 RX ADMIN — MONTELUKAST 10 MG: 10 TABLET, FILM COATED ORAL at 09:10

## 2023-10-13 RX ADMIN — PREDNISONE 40 MG: 20 TABLET ORAL at 09:10

## 2023-10-13 RX ADMIN — FLUTICASONE FUROATE AND VILANTEROL TRIFENATATE 1 PUFF: 100; 25 POWDER RESPIRATORY (INHALATION) at 07:10

## 2023-10-13 RX ADMIN — TICAGRELOR 90 MG: 90 TABLET ORAL at 09:10

## 2023-10-13 RX ADMIN — HUMAN ALBUMIN MICROSPHERES AND PERFLUTREN 0.66 MG: 10; .22 INJECTION, SOLUTION INTRAVENOUS at 10:10

## 2023-10-13 RX ADMIN — ASPIRIN 81 MG CHEWABLE TABLET 81 MG: 81 TABLET CHEWABLE at 09:10

## 2023-10-13 RX ADMIN — IPRATROPIUM BROMIDE AND ALBUTEROL SULFATE 3 ML: .5; 3 SOLUTION RESPIRATORY (INHALATION) at 01:10

## 2023-10-13 RX ADMIN — IPRATROPIUM BROMIDE AND ALBUTEROL SULFATE 3 ML: .5; 3 SOLUTION RESPIRATORY (INHALATION) at 07:10

## 2023-10-13 RX ADMIN — AZITHROMYCIN 250 MG: 250 TABLET, FILM COATED ORAL at 09:10

## 2023-10-13 RX ADMIN — GUAIFENESIN 600 MG: 600 TABLET, EXTENDED RELEASE ORAL at 09:10

## 2023-10-13 NOTE — PLAN OF CARE
[facility] - Med Surg  Initial Discharge Assessment       Primary Care Provider: Deloris Gaxiola NP    Admission Diagnosis: Shortness of breath [R06.02]  SOB (shortness of breath) [R06.02]  History of CHF (congestive heart failure) [Z86.79]  Chest pain [R07.9]  Exacerbation of asthma, unspecified asthma severity, unspecified whether persistent [J45.901]    Admission Date: 10/12/2023  Expected Discharge Date:     Transition of Care Barriers: None    Payor: BLUE CROSS BLUE SHIELD / Plan: [place] PPO / Product Type: PPO /     Extended Emergency Contact Information  Primary Emergency Contact: Johnnie Dewitt  Mobile Phone: [phone]  Relation: Son    Discharge Plan A: Home with family  Discharge Plan B: Home      [facility] - [place] - [address]  [address]  [place] [address]  Phone: [phone] Fax: [phone]      Initial Assessment (most recent)       Adult Discharge Assessment - 10/13/23 1250          Discharge Assessment    Assessment Type Discharge Planning Assessment     Confirmed/corrected address, phone number and insurance Yes     Confirmed Demographics Correct on Facesheet     Source of Information patient     When was your last doctors appointment? 04/03/23     Communicated MESHA with patient/caregiver Date not available/Unable to determine     Reason For Admission Bronchitis, acute, with bronchospasm     People in Home child(kenyatta), adult     Do you expect to return to your current living situation? Yes     Do you have help at home or someone to help you manage your care at home? Yes     Who are your caregiver(s) and their phone number(s)? Son [phone]     Current cognitive status: Alert/Oriented     Equipment Currently Used at Home none     Readmission within 30 days? No     Patient currently being followed by outpatient case management? No     Do you currently have service(s) that help you manage your care at home? No     Do you take prescription  medications? Yes     Do you have prescription coverage? Yes     Do you have any problems affording any of your prescribed medications? No     Is the patient taking medications as prescribed? yes     Who is going to help you get home at discharge? Son     How do you get to doctors appointments? car, drives self;family or friend will provide     Are you on dialysis? No     Do you take coumadin? No     DME Needed Upon Discharge  none     Discharge Plan discussed with: Patient     Transition of Care Barriers None     Discharge Plan A Home with family     Discharge Plan B Home        Physical Activity    On average, how many days per week do you engage in moderate to strenuous exercise (like a brisk walk)? 0 days        Housing Stability    In the last 12 months, was there a time when you were not able to pay the mortgage or rent on time? No     In the last 12 months, was there a time when you did not have a steady place to sleep or slept in a shelter (including now)? No        Food Insecurity    Within the past 12 months, you worried that your food would run out before you got the money to buy more. Never true     Within the past 12 months, the food you bought just didn't last and you didn't have money to get more. Never true        Social Connections    How often do you attend Moravian or Taoism services? 1 to 4 times per year     Do you belong to any clubs or organizations such as Moravian groups, unions, fraternal or athletic groups, or school groups? No     How often do you attend meetings of the clubs or organizations you belong to? Never                      Pt lives with his son and his son or friend will bring him home upon discharge. He is not on dialysis or coumadin.    Selene Grajeda RN    [facility]  [phone]

## 2023-10-13 NOTE — ASSESSMENT & PLAN NOTE
-Afebrile, no leukocytosis.  -COVID negative.  -Sputum cx pending.  -Flu/RSV in process.  -Procalcitonin in process.  -CXR with central peribronchial findings concerning for inflammation or infection.  -Continue symbicort. Start scheduled duonebs q6 hrs while awake and PRN.  -Received rocephin and azithromcyin in the ED, change to doxy for now.  -Received IV solumedrol, continue prednisone 40mg x5 days.  -Start mucinex and PRN tessalon perles.

## 2023-10-13 NOTE — H&P
[facility] - Emergency Dept  [facility] Medicine  History & Physical    Patient Name: Gordon Dewitt  MRN: [id number]  Patient Class: IP- Inpatient  Admission Date: 10/12/2023  Attending Physician: Carlie Alvarado MD   Primary Care Provider: Deloris Gaxiola NP         Patient information was obtained from patient, past medical records, and ER records.     Subjective:     Principal Problem:Bronchitis, acute, with bronchospasm    Chief Complaint:   Chief Complaint   Patient presents with    Shortness of Breath     X2 days.         HPI: Gordon Dewitt is a 63 y.o. male with a PMHx of CHF (EF 30%), prior MI, CAD, chronic cough, bronchospams and HLD who presents for worsening shortness of breath and cough for the past 6 days. He was hospitalized in June with similar symptoms. He has had a chronic cough for the past year but it has worsened over the last week and become productive with yellow sputum. He also endorses wheezing as well as chest pain and abdominal pain when he is coughing. He tried taking his usual inhalers and prednisone 20mg daily at home which did not relieve his symptoms. He also complains of ulcers on his tongue and mouth which are improved from several months ago. Does not use any home oxygen. Patient says that he had recently undergone a workup for eosinophilic asthma. He denies N/V/D, fever/chills, headache, lightheadedness/dizziness, or dysuria.    In the ED, patient tachypneic and hypoxic (89%) on room air which improved with supplemental oxygen, afebrile. CBC and CMP unremarkable. BNP 78. Troponin <0.006. COVID negative. CXR with central peribronchial findings are concerning for peribronchial inflammation or infection. Initial VBG with pH 7.27 but repeat ABG WNL. The patient received duo nebs x3, solumedrol 125mg, azithromycin, and rocephin.    Past Medical History:   Diagnosis Date    CAD (coronary artery disease)     CHF (congestive heart failure), NYHA class I     Hyperlipidemia     Myocardial  infarction        History reviewed. No pertinent surgical history.    Review of patient's allergies indicates:  No Known Allergies    No current facility-administered medications on file prior to encounter.     Current Outpatient Medications on File Prior to Encounter   Medication Sig    albuterol (PROVENTIL/VENTOLIN HFA) 90 mcg/actuation inhaler 2 puffs every 4 (four) hours as needed.    albuterol-ipratropium (DUO-NEB) 2.5 mg-0.5 mg/3 mL nebulizer solution Take 3 mLs by nebulization every 4 (four) hours as needed for Wheezing. Rescue    aspirin 81 MG Chew Take 81 mg by mouth once daily.    atorvastatin (LIPITOR) 40 MG tablet Take 1 tablet (40 mg total) by mouth every evening.    azelastine (ASTELIN) 137 mcg (0.1 %) nasal spray 1 spray (137 mcg total) by Nasal route 2 (two) times daily as needed for Rhinitis.    benzocaine/menthol/zinc chlor (ORAJEL 3X MOUTH SORES MM) by Mucous Membrane route. Apply topically to mouth blisters daily.    BRILINTA 90 mg tablet Take 1 tablet (90 mg total) by mouth 2 (two) times daily.    budesonide-formoterol 160-4.5 mcg (SYMBICORT) 160-4.5 mcg/actuation HFAA Inhale 2 puffs into the lungs every 12 (twelve) hours. Controller    dapagliflozin (FARXIGA) 10 mg tablet Take 1 tablet (10 mg total) by mouth once daily at 6am.    ENTRESTO 24-26 mg per tablet Take 1 tablet by mouth 2 (two) times daily.    fluticasone propionate (FLONASE) 50 mcg/actuation nasal spray 1 spray (50 mcg total) by Each Nostril route daily as needed for Rhinitis.    fluticasone-salmeterol diskus inhaler 250-50 mcg Inhale 1 puff into the lungs 2 (two) times daily. Controller    ibuprofen (ADVIL,MOTRIN) 200 MG tablet Take 400 mg by mouth daily as needed for Pain.    levocetirizine (XYZAL) 5 MG tablet Take 1 tablet (5 mg total) by mouth every evening.    metoprolol succinate (TOPROL-XL) 25 MG 24 hr tablet Take 1 tablet (25 mg total) by mouth once daily. HOLD THIS MEDICATION UNTIL YOU COMPLETE STEROIDS.  IT CAN CONTRIBUTE  TO BRONCHOSPASMS    montelukast (SINGULAIR) 10 mg tablet Take 10 mg by mouth once daily.    nitroGLYCERIN (NITROSTAT) 0.4 MG SL tablet Place 0.4 mg under the tongue.    omega-3 fatty acids/fish oil (FISH OIL-OMEGA-3 FATTY ACIDS) 300-1,000 mg capsule Take 1 capsule by mouth once a week.     Family History       Problem Relation (Age of Onset)    Asthma Father    Diabetes Father, Brother    Heart disease Brother          Tobacco Use    Smoking status: Never     Passive exposure: Never    Smokeless tobacco: Never   Substance and Sexual Activity    Alcohol use: Never    Drug use: Never    Sexual activity: Yes     Partners: Female     Birth control/protection: None     Comment: Partner post menopausal     Review of Systems   Constitutional:  Negative for appetite change, chills, diaphoresis, fatigue and fever.   HENT:  Positive for mouth sores (improving). Negative for congestion, sneezing and sore throat.    Eyes:  Negative for photophobia and visual disturbance.   Respiratory:  Positive for cough, shortness of breath and wheezing.    Cardiovascular:  Positive for chest pain (with coughing). Negative for palpitations and leg swelling.   Gastrointestinal:  Positive for abdominal pain (with coughing). Negative for abdominal distention, diarrhea, nausea and vomiting.   Genitourinary:  Negative for dysuria, frequency and hematuria.   Musculoskeletal:  Negative for back pain, myalgias and neck pain.   Skin:  Negative for color change, pallor, rash and wound.   Neurological:  Negative for dizziness, syncope, weakness and headaches.   Psychiatric/Behavioral:  Negative for confusion and hallucinations. The patient is not nervous/anxious.      Objective:     Vital Signs (Most Recent):  Temp: 98.2 °F (36.8 °C) (10/12/23 2300)  Pulse: 73 (10/12/23 2300)  Resp: 20 (10/12/23 2300)  BP: 104/68 (10/12/23 2300)  SpO2: 95 % (10/12/23 2300) Vital Signs (24h Range):  Temp:  [97 °F (36.1 °C)-98.2 °F (36.8 °C)] 98.2 °F (36.8 °C)  Pulse:   [65-95] 73  Resp:  [17-45] 20  SpO2:  [84 %-100 %] 95 %  BP: (101-132)/(60-83) 104/68     Weight: 75.8 kg (167 lb 1.7 oz)  Body mass index is 28.68 kg/m².     Physical Exam  Vitals and nursing note reviewed.   Constitutional:       General: He is not in acute distress.     Appearance: He is not toxic-appearing or diaphoretic.   HENT:      Head: Normocephalic and atraumatic.      Nose: Nose normal.      Mouth/Throat:      Mouth: Mucous membranes are moist.   Eyes:      Pupils: Pupils are equal, round, and reactive to light.   Cardiovascular:      Rate and Rhythm: Normal rate and regular rhythm.      Pulses: Normal pulses.   Pulmonary:      Effort: Pulmonary effort is normal. No respiratory distress.      Breath sounds: No wheezing, rhonchi or rales.      Comments: Currently on 2L of O2 via nasal cannula.  Abdominal:      General: Bowel sounds are normal. There is no distension.      Palpations: Abdomen is soft.      Tenderness: There is no abdominal tenderness. There is no guarding.   Musculoskeletal:         General: No swelling, tenderness or deformity. Normal range of motion.      Cervical back: Normal range of motion.      Right lower leg: No edema.      Left lower leg: No edema.   Skin:     General: Skin is warm and dry.      Capillary Refill: Capillary refill takes less than 2 seconds.   Neurological:      General: No focal deficit present.      Mental Status: He is alert and oriented to person, place, and time.      Sensory: No sensory deficit.      Motor: No weakness.   Psychiatric:         Mood and Affect: Mood normal.         Behavior: Behavior normal.              CRANIAL NERVES     CN III, IV, VI   Pupils are equal, round, and reactive to light.       Significant Labs: All pertinent labs within the past 24 hours have been reviewed.  CBC:   Recent Labs   Lab 10/12/23  1858   WBC 9.12   HGB 12.8*   HCT 42.5        CMP:   Recent Labs   Lab 10/12/23  2021      K 3.5      CO2 23   *    BUN 15   CREATININE 1.0   CALCIUM 9.0   PROT 7.3   ALBUMIN 3.7   BILITOT 0.3   ALKPHOS 92   AST 16   ALT 17   ANIONGAP 12       Significant Imaging: I have reviewed all pertinent imaging results/findings within the past 24 hours.  Imaging Results              X-Ray Chest AP Portable (Final result)  Result time 10/12/23 18:52:52      Final result by Ruperto Reyes MD (10/12/23 18:52:52)                   Impression:      1. Central peribronchial findings are concerning for peribronchial inflammation or infection.  Correlation is advised.      Electronically signed by: Ruperto Reyes MD  Date:    10/12/2023  Time:    18:52               Narrative:    EXAMINATION:  XR CHEST AP PORTABLE    CLINICAL HISTORY:  Shortness of breath    TECHNIQUE:  Single frontal view of the chest was performed.    COMPARISON:  06/05/2023    FINDINGS:  The cardiomediastinal silhouette is not enlarged.  There is prominence of the right paratracheal stripe, stable..  There is no pleural effusion.  The trachea is midline.  The lungs are symmetrically expanded bilaterally with mildly coarse interstitial attenuation, accentuated by habitus.  There is bilateral peribronchial thickening..  No large focal consolidation seen.  There is no pneumothorax.  The osseous structures are remarkable for degenerative change..                                      Assessment/Plan:     * Bronchitis, acute, with bronchospasm  -Afebrile, no leukocytosis.  -COVID negative.  -Sputum cx pending.  -Flu/RSV in process.  -Procalcitonin in process.  -CXR with central peribronchial findings concerning for inflammation or infection.  -Continue symbicort. Start scheduled duonebs q6 hrs while awake and PRN.  -Received rocephin and azithromcyin in the ED, change to doxy for now.  -Received IV solumedrol, continue prednisone 40mg x5 days.  -Start mucinex and PRN tessalon perles.    Recurrent bronchospasm  -Patient with multiple work ups with pulmonology, ENT, allergist,  and rheumatologist.  -PFTs normal.  -In June ENT performed flexible laryngoscopy with no acute or chronic findings.    -Was discussed previously that he could consider discussion with cardiologist regarding a trial of discontinuation of metoprolol and Entresto at different times to see if either of these medications are contributing to bronchospasms. It appears this was never done?  -Continue symbicort, antihistamine, singulair, and flonase.    Hyperlipidemia   Patient is chronically on statin.will continue for now. Monitor clinically. Last LDL was   Lab Results   Component Value Date    LDLCALC 62.0 (L) 01/18/2023        Coronary artery disease involving native coronary artery of native heart  Patient with known CAD s/p stent placement, which is controlled Will continue brilinta, ASA and Statin and monitor for S/Sx of angina/ACS. Continue to monitor on telemetry.     HFrEF (heart failure with reduced ejection fraction)  Patient is identified as having Systolic (HFrEF) heart failure that is Chronic. CHF is currently controlled. Latest ECHO performed at OSH 5/4/22 and demonstrates-   The LV ejection fraction is severely decreased   Ejection Fraction = 30 %.   There is septal wall moderate hypokinesis.   There is inferior wall severe hypokinesis.   The left ventricle is mildly dilated.   . Continue ARNI and monitor clinical status closely. Monitor on telemetry. Patient is off CHF pathway.  Monitor strict Is&Os and daily weights.  Place on fluid restriction of 1.5 L. Continue to stress to patient importance of self efficacy and  on diet for CHF. Last BNP reviewed- and noted below   Recent Labs   Lab 10/12/23  1858   BNP 78   -Repeat ECHO      VTE Risk Mitigation (From admission, onward)           Ordered     IP VTE LOW RISK PATIENT  Once         10/13/23 0000     Place sequential compression device  Until discontinued         10/13/23 0000                               Queta Barcenas NP  Department of  [facility] Medicine  [facility] - Emergency Dept

## 2023-10-13 NOTE — ED PROVIDER NOTES
Encounter Date: 10/12/2023       History     Chief Complaint   Patient presents with    Shortness of Breath     X2 days.      Mr. Dewitt is a 64 yo M with a PMHx of CHF, prior MI, CAD, bronchospams and HLD who presents for worsening shortness of breath and cough for the past 6 days. He was hospitalized in June with similar symptoms. He has had a chronic cough for the past year but it has worsened over the past week and become productive with yellow sputum. He also endorses chest pain when he is coughing. He tried taking his usual inhalers and prednisone at home which did not relieve his symptoms. He also complains of ulcers on his tongue and mouth. Does not use any home oxygen. Patient says that he had recently undergone a workup for eosinophilic asthma.    The history is provided by the patient, a relative and medical records.     Review of patient's allergies indicates:  No Known Allergies  Past Medical History:   Diagnosis Date    CAD (coronary artery disease)     CHF (congestive heart failure), NYHA class I     Hyperlipidemia     Myocardial infarction      History reviewed. No pertinent surgical history.  Family History   Problem Relation Age of Onset    Asthma Father     Diabetes Father     Diabetes Brother     Heart disease Brother      Social History     Tobacco Use    Smoking status: Never     Passive exposure: Never    Smokeless tobacco: Never   Substance Use Topics    Alcohol use: Never    Drug use: Never     Review of Systems    Physical Exam     Initial Vitals [10/12/23 1708]   BP Pulse Resp Temp SpO2   122/83 83 (!) 25 97.1 °F (36.2 °C) (!) 94 %      MAP       --         Physical Exam    Constitutional: He appears well-developed and well-nourished.   HENT:   Head: Normocephalic and atraumatic.   Eyes: Conjunctivae are normal.   Neck: Neck supple.   Normal range of motion.  Cardiovascular:  Normal rate, regular rhythm, normal heart sounds and intact distal pulses.           Pulmonary/Chest: He has  wheezes (Diffuse). He exhibits no tenderness.   Abdominal: Abdomen is soft. Bowel sounds are normal. He exhibits no distension. There is no abdominal tenderness.   Musculoskeletal:         General: No tenderness or edema. Normal range of motion.      Cervical back: Normal range of motion and neck supple.     Neurological: He is alert and oriented to person, place, and time.   Skin: Skin is warm and dry. Capillary refill takes less than 2 seconds.   Psychiatric: He has a normal mood and affect.         ED Course   Procedures  Labs Reviewed   CBC W/ AUTO DIFFERENTIAL - Abnormal; Notable for the following components:       Result Value    Hemoglobin 12.8 (*)     MCV 76 (*)     MCH 23.0 (*)     MCHC 30.1 (*)     RDW 16.9 (*)     Immature Grans (Abs) 0.05 (*)     Lymph # 0.7 (*)     Gran % 83.4 (*)     Lymph % 7.3 (*)     All other components within normal limits   COMPREHENSIVE METABOLIC PANEL - Abnormal; Notable for the following components:    Glucose 136 (*)     All other components within normal limits   ISTAT PROCEDURE - Abnormal; Notable for the following components:    POC PH 7.272 (*)     POC PCO2 46.3 (*)     POC PO2 97 (*)     POC HCO3 21.4 (*)     POC TCO2 23 (*)     All other components within normal limits   ISTAT PROCEDURE - Abnormal; Notable for the following components:    POC TCO2 28 (*)     All other components within normal limits   CULTURE, RESPIRATORY   INFLUENZA A & B BY MOLECULAR   B-TYPE NATRIURETIC PEPTIDE   TROPONIN I   SARS-COV-2 RNA AMPLIFICATION, QUAL   PROCALCITONIN   RSV ANTIGEN DETECTION          Imaging Results              X-Ray Chest AP Portable (Final result)  Result time 10/12/23 18:52:52      Final result by Ruperto Reyes MD (10/12/23 18:52:52)                   Impression:      1. Central peribronchial findings are concerning for peribronchial inflammation or infection.  Correlation is advised.      Electronically signed by: Ruperto Reyes  MD  Date:    10/12/2023  Time:    18:52               Narrative:    EXAMINATION:  XR CHEST AP PORTABLE    CLINICAL HISTORY:  Shortness of breath    TECHNIQUE:  Single frontal view of the chest was performed.    COMPARISON:  06/05/2023    FINDINGS:  The cardiomediastinal silhouette is not enlarged.  There is prominence of the right paratracheal stripe, stable..  There is no pleural effusion.  The trachea is midline.  The lungs are symmetrically expanded bilaterally with mildly coarse interstitial attenuation, accentuated by habitus.  There is bilateral peribronchial thickening..  No large focal consolidation seen.  There is no pneumothorax.  The osseous structures are remarkable for degenerative change..                                       Medications   cefTRIAXone (ROCEPHIN) 1 g in dextrose 5 % in water (D5W) 100 mL IVPB (MB+) (has no administration in time range)   aspirin chewable tablet 81 mg (has no administration in time range)   albuterol-ipratropium 2.5 mg-0.5 mg/3 mL nebulizer solution 3 mL (has no administration in time range)   albuterol-ipratropium 2.5 mg-0.5 mg/3 mL nebulizer solution 3 mL (has no administration in time range)   atorvastatin tablet 40 mg (has no administration in time range)   azelastine 137 mcg (0.1 %) nasal spray 137 mcg (has no administration in time range)   montelukast tablet 10 mg (has no administration in time range)   cetirizine tablet 5 mg (has no administration in time range)   predniSONE tablet 40 mg (has no administration in time range)   albuterol-ipratropium 2.5 mg-0.5 mg/3 mL nebulizer solution 3 mL (3 mLs Nebulization Given 10/12/23 1925)   methylPREDNISolone sodium succinate injection 125 mg (125 mg Intravenous Given 10/12/23 1928)   azithromycin (ZITHROMAX) 500 mg in dextrose 5 % (D5W) 250 mL IVPB (Vial-Mate) (500 mg Intravenous New Bag 10/12/23 2214)     Medical Decision Making  Patient is a 64 yo M with a PMHx of CAD, MI, CHF, recurrent bronchospasms, asthma and HLD  who presents with worsening SOB and a productive cough for the past 6 days. Differential at this time would include HF exacerbation, pneumonia, reactive airway disease, or ACS. BNP and Troponin WNL. EKG shows no signs of ischemia. Unlikely to be ACS or CHF exacerbation. Likely to be some type of reactive airway disease given his diffuse wheezing. Patient improved with administration of duo nebs and steroids. Pneumonia remains a concern but less likely in the setting of a normal WBC and no fevers. CXR shows peribronchial inflammation vs infection. Patient will be admitted for further care.    Amount and/or Complexity of Data Reviewed  Labs: ordered.    Risk  Prescription drug management.  Decision regarding hospitalization.              Attending Attestation:   Physician Attestation Statement for Resident:  As the supervising MD   Physician Attestation Statement: I have personally seen and examined this patient.   I agree with the above history.  -:   As the supervising MD I agree with the above PE.     As the supervising MD I agree with the above treatment, course, plan, and disposition.                                    Clinical Impression:   Final diagnoses:  [R06.02] Shortness of breath  [R06.02] SOB (shortness of breath)  [J45.901] Exacerbation of asthma, unspecified asthma severity, unspecified whether persistent (Primary)        ED Disposition Condition    Admit Stable                Moises Alfonso MD  Resident  10/12/23 1185       Yuki Sandhu MD  10/15/23 2909

## 2023-10-13 NOTE — ASSESSMENT & PLAN NOTE
-Patient with multiple work ups with pulmonology, ENT, allergist, and rheumatologist.  -PFTs normal.  -In June ENT performed flexible laryngoscopy with no acute or chronic findings.    -Was discussed previously that he could consider discussion with cardiologist regarding a trial of discontinuation of metoprolol and Entresto at different times to see if either of these medications are contributing to bronchospasms. It appears this was never done?  -Continue symbicort, antihistamine, singulair, and flonase.

## 2023-10-13 NOTE — NURSING
Nurses Note -- 4 Eyes      10/13/2023   4:42 AM      Skin assessed during: Admit      [x] No Altered Skin Integrity Present    []Prevention Measures Documented      [] Yes- Altered Skin Integrity Present or Discovered   [] LDA Added if Not in Epic (Describe Wound)   [] New Altered Skin Integrity was Present on Admit and Documented in LDA   [] Wound Image Taken    Wound Care Consulted? No    Attending Nurse:  Elizabeth Barnhart RN/Staff Member:   STUART Sims

## 2023-10-13 NOTE — ED NOTES
Took report from Amy DAVIDSON, and assumed care of pt at this time. Pt resting comfortably and independently repositioned in stretcher with bed locked in lowest position for safety. NAD noted at this time. Respirations even and unlabored and visible chest rise noted.  Patient offered bathroom assistance and denies need at this time. Pt instructed to call if assistance is needed. Pt on continuous cardiac, BP, and O2 monitoring. Call light within reach. No needs at this time. Will continue to monitor.

## 2023-10-13 NOTE — ASSESSMENT & PLAN NOTE
Patient is identified as having Systolic (HFrEF) heart failure that is Chronic. CHF is currently controlled. Latest ECHO performed at OSH 5/4/22 and demonstrates-   The LV ejection fraction is severely decreased   Ejection Fraction = 30 %.   There is septal wall moderate hypokinesis.   There is inferior wall severe hypokinesis.   The left ventricle is mildly dilated.   . Continue ARNI and monitor clinical status closely. Monitor on telemetry. Patient is off CHF pathway.  Monitor strict Is&Os and daily weights.  Place on fluid restriction of 1.5 L. Continue to stress to patient importance of self efficacy and  on diet for CHF. Last BNP reviewed- and noted below   Recent Labs   Lab 10/12/23  1858   BNP 78   -Repeat ECHO

## 2023-10-13 NOTE — ASSESSMENT & PLAN NOTE
Patient is chronically on statin.will continue for now. Monitor clinically. Last LDL was   Lab Results   Component Value Date    LDLCALC 62.0 (L) 01/18/2023

## 2023-10-13 NOTE — ED NOTES
Pt care assumed. Report received by STUART Warner. Pt lying in stretcher in low and locked position and side rails raised x2. Call light, pt's belongings, and bedside table within pt's reach. Pt in NAD and verbalized no needs at this time. Family member x1 at bedside.

## 2023-10-13 NOTE — ASSESSMENT & PLAN NOTE
Patient with known CAD s/p stent placement, which is controlled Will continue brilinta, ASA and Statin and monitor for S/Sx of angina/ACS. Continue to monitor on telemetry.

## 2023-10-13 NOTE — DISCHARGE INSTRUCTIONS
- Follow-up with outpatient pulmonology.   - Continue taking prednisone 40mg daily for next 5 days  - Take guaifenesin 2x/day to help with cough.   - Continue the rest of your regular home medications as prescribed.   - Follow-up with your PCP to discuss results of iron panel, discuss if further work-up/evaluation is needed for your anemia, and help get you scheduled for an updated colonoscopy.

## 2023-10-13 NOTE — HPI
Gordon Dewitt is a 63 y.o. male with a PMHx of HFrEF (EF 30%), prior MI, CAD, chronic cough, bronchospams and HLD who presented 10/12/23 for worsening shortness of breath & cough x 6 days. He was hospitalized in June with similar symptoms. He has had a chronic cough for the past year but it has worsened over the last week and become productive of sputum (clear speckled with yellow) He also endorses wheezing as well as chest pain and abdominal pain when he is coughing. He tried taking his usual inhalers, nebulizers, and prednisone 20mg daily (x2 doses) at home which did not relieve his symptoms. He also complains of ulcers on his tongue and mouth which are improved from several months ago. Does not use any home oxygen. Patient says that he had recently undergone a workup for eosinophilic asthma. He denies N/V/D, fever/chills, headache, lightheadedness/dizziness, or dysuria.    In the ED, patient tachypneic and hypoxic (89%) on room air which improved with supplemental oxygen, afebrile. CBC and CMP unremarkable. BNP 78. Troponin <0.006. COVID negative. CXR with central peribronchial findings are concerning for peribronchial inflammation or infection. Initial VBG with pH 7.27 but repeat ABG WNL. The patient received duo nebs x3, solumedrol 125mg, azithromycin, and rocephin.

## 2023-10-13 NOTE — ED NOTES
Telemetry Verification   Patient placed on Telemetry Box  Verified with War Room  Box # [id number]   Monitor Tech Katie   Rate 56   Rhythm Sinus Bradycardia

## 2023-10-13 NOTE — PLAN OF CARE
[facility] - Med Surg  Discharge Final Note    Primary Care Provider: Deloris Gaxiola NP    Expected Discharge Date: 10/13/2023    Final Discharge Note (most recent)       Final Note - 10/13/23 1704          Final Note    Assessment Type Final Discharge Note     Anticipated Discharge Disposition Home or Self Care     Hospital Resources/Appts/Education Provided Provided patient/caregiver with written discharge plan information   per bedside nurse       Post-Acute Status    Discharge Delays None known at this time                     Important Message from Medicare             Contact Info       Deloris Gaxiola NP   Specialty: Internal Medicine   Relationship: PCP - General    [address] [place] [address]   Phone: [phone]       Next Steps: Call    Instructions: - Follow-up results of iron studies and further evaluation of anemia    [facility]   Specialty: Pulmonology    [address]  [address]  [place] [address]   Phone: [phone]       Next Steps: Go to    Instructions: Appt on 10/17/23 w/ María Jaramillo NP.          Pt was discharged home with no needs.    Selene Grajeda RN    [facility]  [phone]

## 2023-10-13 NOTE — NURSING
AVS/DC instructions given to patient with all questions/concerns addressed. PIV removed with direct pressure applied. No concerns as of present. Awaiting transport. POC on going.

## 2023-10-13 NOTE — SUBJECTIVE & OBJECTIVE
Past Medical History:   Diagnosis Date    CAD (coronary artery disease)     CHF (congestive heart failure), NYHA class I     Hyperlipidemia     Myocardial infarction        History reviewed. No pertinent surgical history.    Review of patient's allergies indicates:  No Known Allergies    No current facility-administered medications on file prior to encounter.     Current Outpatient Medications on File Prior to Encounter   Medication Sig    albuterol (PROVENTIL/VENTOLIN HFA) 90 mcg/actuation inhaler 2 puffs every 4 (four) hours as needed.    albuterol-ipratropium (DUO-NEB) 2.5 mg-0.5 mg/3 mL nebulizer solution Take 3 mLs by nebulization every 4 (four) hours as needed for Wheezing. Rescue    aspirin 81 MG Chew Take 81 mg by mouth once daily.    atorvastatin (LIPITOR) 40 MG tablet Take 1 tablet (40 mg total) by mouth every evening.    azelastine (ASTELIN) 137 mcg (0.1 %) nasal spray 1 spray (137 mcg total) by Nasal route 2 (two) times daily as needed for Rhinitis.    benzocaine/menthol/zinc chlor (ORAJEL 3X MOUTH SORES MM) by Mucous Membrane route. Apply topically to mouth blisters daily.    BRILINTA 90 mg tablet Take 1 tablet (90 mg total) by mouth 2 (two) times daily.    budesonide-formoterol 160-4.5 mcg (SYMBICORT) 160-4.5 mcg/actuation HFAA Inhale 2 puffs into the lungs every 12 (twelve) hours. Controller    dapagliflozin (FARXIGA) 10 mg tablet Take 1 tablet (10 mg total) by mouth once daily at 6am.    ENTRESTO 24-26 mg per tablet Take 1 tablet by mouth 2 (two) times daily.    fluticasone propionate (FLONASE) 50 mcg/actuation nasal spray 1 spray (50 mcg total) by Each Nostril route daily as needed for Rhinitis.    fluticasone-salmeterol diskus inhaler 250-50 mcg Inhale 1 puff into the lungs 2 (two) times daily. Controller    ibuprofen (ADVIL,MOTRIN) 200 MG tablet Take 400 mg by mouth daily as needed for Pain.    levocetirizine (XYZAL) 5 MG tablet Take 1 tablet (5 mg total) by mouth every evening.    metoprolol  succinate (TOPROL-XL) 25 MG 24 hr tablet Take 1 tablet (25 mg total) by mouth once daily. HOLD THIS MEDICATION UNTIL YOU COMPLETE STEROIDS.  IT CAN CONTRIBUTE TO BRONCHOSPASMS    montelukast (SINGULAIR) 10 mg tablet Take 10 mg by mouth once daily.    nitroGLYCERIN (NITROSTAT) 0.4 MG SL tablet Place 0.4 mg under the tongue.    omega-3 fatty acids/fish oil (FISH OIL-OMEGA-3 FATTY ACIDS) 300-1,000 mg capsule Take 1 capsule by mouth once a week.     Family History       Problem Relation (Age of Onset)    Asthma Father    Diabetes Father, Brother    Heart disease Brother          Tobacco Use    Smoking status: Never     Passive exposure: Never    Smokeless tobacco: Never   Substance and Sexual Activity    Alcohol use: Never    Drug use: Never    Sexual activity: Yes     Partners: Female     Birth control/protection: None     Comment: Partner post menopausal     Review of Systems   Constitutional:  Negative for appetite change, chills, diaphoresis, fatigue and fever.   HENT:  Positive for mouth sores (improving). Negative for congestion, sneezing and sore throat.    Eyes:  Negative for photophobia and visual disturbance.   Respiratory:  Positive for cough, shortness of breath and wheezing.    Cardiovascular:  Positive for chest pain (with coughing). Negative for palpitations and leg swelling.   Gastrointestinal:  Positive for abdominal pain (with coughing). Negative for abdominal distention, diarrhea, nausea and vomiting.   Genitourinary:  Negative for dysuria, frequency and hematuria.   Musculoskeletal:  Negative for back pain, myalgias and neck pain.   Skin:  Negative for color change, pallor, rash and wound.   Neurological:  Negative for dizziness, syncope, weakness and headaches.   Psychiatric/Behavioral:  Negative for confusion and hallucinations. The patient is not nervous/anxious.      Objective:     Vital Signs (Most Recent):  Temp: 98.2 °F (36.8 °C) (10/12/23 2300)  Pulse: 73 (10/12/23 2300)  Resp: 20 (10/12/23  2300)  BP: 104/68 (10/12/23 2300)  SpO2: 95 % (10/12/23 2300) Vital Signs (24h Range):  Temp:  [97 °F (36.1 °C)-98.2 °F (36.8 °C)] 98.2 °F (36.8 °C)  Pulse:  [65-95] 73  Resp:  [17-45] 20  SpO2:  [84 %-100 %] 95 %  BP: (101-132)/(60-83) 104/68     Weight: 75.8 kg (167 lb 1.7 oz)  Body mass index is 28.68 kg/m².     Physical Exam  Vitals and nursing note reviewed.   Constitutional:       General: He is not in acute distress.     Appearance: He is not toxic-appearing or diaphoretic.   HENT:      Head: Normocephalic and atraumatic.      Nose: Nose normal.      Mouth/Throat:      Mouth: Mucous membranes are moist.   Eyes:      Pupils: Pupils are equal, round, and reactive to light.   Cardiovascular:      Rate and Rhythm: Normal rate and regular rhythm.      Pulses: Normal pulses.   Pulmonary:      Effort: Pulmonary effort is normal. No respiratory distress.      Breath sounds: No wheezing, rhonchi or rales.      Comments: Currently on 2L of O2 via nasal cannula.  Abdominal:      General: Bowel sounds are normal. There is no distension.      Palpations: Abdomen is soft.      Tenderness: There is no abdominal tenderness. There is no guarding.   Musculoskeletal:         General: No swelling, tenderness or deformity. Normal range of motion.      Cervical back: Normal range of motion.      Right lower leg: No edema.      Left lower leg: No edema.   Skin:     General: Skin is warm and dry.      Capillary Refill: Capillary refill takes less than 2 seconds.   Neurological:      General: No focal deficit present.      Mental Status: He is alert and oriented to person, place, and time.      Sensory: No sensory deficit.      Motor: No weakness.   Psychiatric:         Mood and Affect: Mood normal.         Behavior: Behavior normal.              CRANIAL NERVES     CN III, IV, VI   Pupils are equal, round, and reactive to light.       Significant Labs: All pertinent labs within the past 24 hours have been reviewed.  CBC:   Recent  Labs   Lab 10/12/23  1858   WBC 9.12   HGB 12.8*   HCT 42.5        CMP:   Recent Labs   Lab 10/12/23  2021      K 3.5      CO2 23   *   BUN 15   CREATININE 1.0   CALCIUM 9.0   PROT 7.3   ALBUMIN 3.7   BILITOT 0.3   ALKPHOS 92   AST 16   ALT 17   ANIONGAP 12       Significant Imaging: I have reviewed all pertinent imaging results/findings within the past 24 hours.  Imaging Results              X-Ray Chest AP Portable (Final result)  Result time 10/12/23 18:52:52      Final result by Ruperto Reyes MD (10/12/23 18:52:52)                   Impression:      1. Central peribronchial findings are concerning for peribronchial inflammation or infection.  Correlation is advised.      Electronically signed by: Ruperto Reyes MD  Date:    10/12/2023  Time:    18:52               Narrative:    EXAMINATION:  XR CHEST AP PORTABLE    CLINICAL HISTORY:  Shortness of breath    TECHNIQUE:  Single frontal view of the chest was performed.    COMPARISON:  06/05/2023    FINDINGS:  The cardiomediastinal silhouette is not enlarged.  There is prominence of the right paratracheal stripe, stable..  There is no pleural effusion.  The trachea is midline.  The lungs are symmetrically expanded bilaterally with mildly coarse interstitial attenuation, accentuated by habitus.  There is bilateral peribronchial thickening..  No large focal consolidation seen.  There is no pneumothorax.  The osseous structures are remarkable for degenerative change..

## 2023-10-13 NOTE — PLAN OF CARE
APPOINTMENT:    Necessary Specialty Appointments: Pulmonology    Specialty Appointment scheduled with Visit with María Jaramillo NP, on Tuesday Oct 17, 2023 1:30 PM

## 2023-10-14 NOTE — ASSESSMENT & PLAN NOTE
Hx of chronic systolic CHF w/ last echo (May 2022) showing LVEF of 30%, and notable hypokinesis of septal & inferior walls. On admission, BNP wnl and CXR without evidence of pulmonary vascular congestion. No e/o volume overload on exam. No e/o exacerbation/decompensation. Repeat echo (10/13/23) showing only mildly reduced EF (40-45%), but was otherwise normal. on admission normal   -- Continue home cardiac package of Entresto, metoprolol XL, & dapagliflozin  -- Follow-up with outpatient cardiology

## 2023-10-14 NOTE — DISCHARGE SUMMARY
[facility] Medicine  Discharge Summary      Patient Name: Gordon Dewitt  MRN: [id number]  SIXTO: [id number]  Patient Class: OP- Observation  Admission Date: 10/12/2023  Hospital Length of Stay: 1 days  Discharge Date and Time: 10/13/2023  4:28 PM  Attending Physician: Megan att. providers found   Discharging Provider: Carlie Alvarado MD  Primary Care Provider: Deloris Gaxiola NP  Hospital Medicine Team: [facility] Carlie Alvarado MD  Primary Care Team: [facility]    HPI:   Gordon Dewitt is a 63 y.o. male with a PMHx of HFrEF (EF 30%), prior MI, CAD, chronic cough, bronchospams and HLD who presented 10/12/23 for worsening shortness of breath & cough x 6 days. He was hospitalized in June with similar symptoms. He has had a chronic cough for the past year but it has worsened over the last week and become productive of sputum (clear speckled with yellow) He also endorses wheezing as well as chest pain and abdominal pain when he is coughing. He tried taking his usual inhalers, nebulizers, and prednisone 20mg daily (x2 doses) at home which did not relieve his symptoms. He also complains of ulcers on his tongue and mouth which are improved from several months ago. Does not use any home oxygen. Patient says that he had recently undergone a workup for eosinophilic asthma. He denies N/V/D, fever/chills, headache, lightheadedness/dizziness, or dysuria.    In the ED, patient tachypneic and hypoxic (89%) on room air which improved with supplemental oxygen, afebrile. CBC and CMP unremarkable. BNP 78. Troponin <0.006. COVID negative. CXR with central peribronchial findings are concerning for peribronchial inflammation or infection. Initial VBG with pH 7.27 but repeat ABG WNL. The patient received duo nebs x3, solumedrol 125mg, azithromycin, and rocephin.       * No surgery found *      Hospital Course:   Following tx in ED, pt admitted to hospital medicine service. By following morning pt on room air w/  "resolution of wheezing. No further severe coughing spells. Further abx held given no evidence of infectious etiology, nor any prior dx of COPD/asthma. Continued on PO steroids & cough suppressant, along with antihistamine, nebulizers, inhalers and home meds. Pt discharged home in stable condition on 10/13 to complete PO steroid course and follow-up with out-patient pulmonology, as well as PCP & cardiology.        Goals of Care Treatment Preferences:  Code Status: Full Code      Problems:     Bronchitis, acute, with bronchospasm  Hx of chronic cough with recurrent bronchospasm w/ prior workups unrevealing thus far. Reported worsening sx over past few days that was responsive to home inhalers, nebulizers, nor two doses of prednisone (20mg daily x2). No known sick contacts. Hypoxic (SpO2 89%) on admission, briefly requiring supplemental O2. CXR showed central peribronchial findings suggestive of inflammation (vs infxn); otherwise clear w/o focal consolidation. COVID/Flu/RSV negative. Afebrile and WBC & procalcitonin wnl. Received dose of CTX & azithromycin, along w/ IV solumedrol & nebulizer treatments w/ subsequent marked improvement in symptoms, and resolution of hypoxia. Given no e/o bacterial infxn, or dx of COPD/asthma, abx d/c'ed.   -- Continue prednisone 40mg x 5 days  -- Continue home nebulizers and inhalers  -- Continue home Rx regimen for mgmt of "recurrent bronchospasm" below  -- Start Mucinex BID (at least until seen by pulmonology)  -- Follow-up w/ outpatient pulmonology (has appt for next week)    Recurrent bronchospasm  Reports progressive chronic cough over past year with recurrent coughing spells resulting in severe SOB, and at times post-tussive emesis &/or pre-syncope. Reports this is 3rd hospitalization in past year for this issue. Has had workups per pulmonology, ENT, allergy & rheumatology that have been unrevealing. PFTs (Feb 2023) normal and flexible laryngoscopy (June 2023) w/o acute or chronic " "findings.   -- May consider trial of d/c'ing metoprolol &/or Entresto to see if contributing to cough/bronchospasm as it appears this has not yet been attempted (?). Follow-up with outpatient cardiology to discuss further.   -- Continue home inhalers, nebulizers, antihistamines & nasal spray  -- Steroids and cough suppressants per "acute bronchitis" above    Coronary artery disease involving native coronary artery of native heart  Known hx of CAD w/ prior STEMI, s/p stent placement (May 2022). On admission, troponin wnl & EKG w/o acute ischemic changes.   -- Continue home cardiac package of ASA, ticagrelor & atorvastatin (along w/ Entresto & metoprolol)   -- Follow-up with outpatient cardiology    HFrEF (heart failure with reduced ejection fraction)  Hx of chronic systolic CHF w/ last echo (May 2022) showing LVEF of 30%, and notable hypokinesis of septal & inferior walls. On admission, BNP wnl and CXR without evidence of pulmonary vascular congestion. No e/o volume overload on exam. No e/o exacerbation/decompensation. Repeat echo (10/13/23) showing only mildly reduced EF (40-45%), but was otherwise normal. on admission normal   -- Continue home cardiac package of Entresto, metoprolol XL, & dapagliflozin  -- Follow-up with outpatient cardiology    Hyperlipidemia  On atorvastatin w/ last lipid panel (Jan 2023): cholesterol 155, HDL 37, LDL 62, TG 80.   -- Continue home statin    Microcytic anemia  Downtrending Hgb over past few months. Baseline previously 13-14, but now down to ~12 w/ MCV 70s. Reports one potential episode of melena a month or so ago, but otherwise denies melena or hematochezia. No other e/o active bleeding. Reports last colonoscopy was ~10 years ago (records not available per chart review). Reports regular red meat intake.   -- Iron studies pending   -- Follow-up with PCP re: iron studies results, referral for colonoscopy, and any other potential anemia workup.       Final Active Diagnoses:    " Diagnosis Date Noted POA    PRINCIPAL PROBLEM:  Bronchitis, acute, with bronchospasm [J20.9] 06/06/2023 Yes    Microcytic anemia [D50.9] 10/13/2023 Yes    Recurrent bronchospasm [J98.09] 02/07/2023 Yes    HFrEF (heart failure with reduced ejection fraction) [I50.20] 05/10/2022 Yes    Hyperlipidemia [E78.5] 05/10/2022 Yes    Coronary artery disease involving native coronary artery of native heart [I25.10] 05/10/2022 Yes      Problems Resolved During this Admission:       Discharged Condition: good    Disposition: Home or Self Care    Follow Up:   Follow-up Information     Deloris Gaxiola NP. Call.    Specialty: Internal Medicine  Why: - Follow-up results of iron studies and further evaluation of anemia  Contact information:  [address] [facility] [address] [place] [address]  [phone]             [facility] - Pulmonology. Go to.    Specialty: Pulmonology  Why: Appt on 10/17/23 w/ María Jaramillo NP.  Contact information:  [address]  [address] [address] [phone]  Additional information:  Please park in [address] or D and enter building by using [address] entrance. Check in at central registration near [facility] in [facility]. Proceed to [address] waiting area once checked in.                     Patient Instructions:      Notify your health care provider if you experience any of the following:  temperature >100.4     Notify your health care provider if you experience any of the following:  difficulty breathing or increased cough     Notify your health care provider if you experience any of the following:  persistent dizziness, light-headedness, or visual disturbances     Activity as tolerated       Significant Diagnostic Studies: N/A    Pending Diagnostic Studies:     Ferritin, Iron and TIBC         Medications:  Reconciled Home Medications:      Medication List      START taking these medications    guaiFENesin 600 mg 12 hr tablet  Commonly known as: MUCINEX  Take 1 tablet (600 mg  total) by mouth 2 (two) times daily. for 10 days     predniSONE 20 MG tablet  Commonly known as: DELTASONE  Take 2 tablets (40 mg total) by mouth once daily. for 5 days  Start taking on: October 14, 2023        CONTINUE taking these medications    albuterol 90 mcg/actuation inhaler  Commonly known as: PROVENTIL/VENTOLIN HFA  2 puffs every 4 (four) hours as needed.     albuterol-ipratropium 2.5 mg-0.5 mg/3 mL nebulizer solution  Commonly known as: DUO-NEB  Take 3 mLs by nebulization every 4 (four) hours as needed for Wheezing. Rescue     aspirin 81 MG Chew  Take 81 mg by mouth once daily.     atorvastatin 40 MG tablet  Commonly known as: LIPITOR  Take 1 tablet (40 mg total) by mouth every evening.     azelastine 137 mcg (0.1 %) nasal spray  Commonly known as: ASTELIN  1 spray (137 mcg total) by Nasal route 2 (two) times daily as needed for Rhinitis.     BRILINTA 90 mg tablet  Generic drug: ticagrelor  Take 1 tablet (90 mg total) by mouth 2 (two) times daily.     budesonide-formoterol 160-4.5 mcg 160-4.5 mcg/actuation Hfaa  Commonly known as: SYMBICORT  Inhale 2 puffs into the lungs every 12 (twelve) hours. Controller     dapagliflozin propanediol 10 mg tablet  Commonly known as: Farxiga  Take 1 tablet (10 mg total) by mouth once daily at 6am.     ENTRESTO 24-26 mg per tablet  Generic drug: sacubitriL-valsartan  Take 1 tablet by mouth 2 (two) times daily.     fish oil-omega-3 fatty acids 300-1,000 mg capsule  Take 1 capsule by mouth once a week.     fluticasone propionate 50 mcg/actuation nasal spray  Commonly known as: FLONASE  1 spray (50 mcg total) by Each Nostril route daily as needed for Rhinitis.     fluticasone-salmeterol 250-50 mcg/dose 250-50 mcg/dose diskus inhaler  Commonly known as: ADVAIR DISKUS  Inhale 1 puff into the lungs 2 (two) times daily. Controller     ibuprofen 200 MG tablet  Commonly known as: ADVIL,MOTRIN  Take 400 mg by mouth daily as needed for Pain.     levocetirizine 5 MG tablet  Commonly  known as: XYZAL  Take 1 tablet (5 mg total) by mouth every evening.     metoprolol succinate 25 MG 24 hr tablet  Commonly known as: TOPROL-XL  Take 1 tablet (25 mg total) by mouth once daily. HOLD THIS MEDICATION UNTIL YOU COMPLETE STEROIDS.  IT CAN CONTRIBUTE TO BRONCHOSPASMS     montelukast 10 mg tablet  Commonly known as: SINGULAIR  Take 10 mg by mouth once daily.     nitroGLYCERIN 0.4 MG SL tablet  Commonly known as: NITROSTAT  Place 0.4 mg under the tongue.     ORAJEL 3X MOUTH SORES MM  by Mucous Membrane route. Apply topically to mouth blisters daily.            Indwelling Lines/Drains at time of discharge:   Lines/Drains/Airways     None                 Time spent on the discharge of patient: 25 minutes        Carlie Alvarado MD  Department of Hospital Medicine  [facility]

## 2023-10-14 NOTE — ASSESSMENT & PLAN NOTE
"Reports progressive chronic cough over past year with recurrent coughing spells resulting in severe SOB, and at times post-tussive emesis &/or pre-syncope. Reports this is 3rd hospitalization in past year for this issue. Has had workups per pulmonology, ENT, allergy & rheumatology that have been unrevealing. PFTs (Feb 2023) normal and flexible laryngoscopy (June 2023) w/o acute or chronic findings.   -- May consider trial of d/c'ing metoprolol &/or Entresto to see if contributing to cough/bronchospasm as it appears this has not yet been attempted (?). Follow-up with outpatient cardiology to discuss further.   -- Continue home inhalers, nebulizers, antihistamines & nasal spray  -- Steroids and cough suppressants per "acute bronchitis"  "

## 2023-10-14 NOTE — HOSPITAL COURSE
Following tx in ED, pt admitted to hospital medicine service. By following morning pt on room air w/ resolution of wheezing. No further severe coughing spells. Further abx held given no evidence of infectious etiology, nor any prior dx of COPD/asthma. Continued on PO steroids along with antihistamine, nebulizers, inhalers and home meds. Pt discharged home in stable condition on 10/13 to complete PO steroid course and follow-up with out patient pulmonology.

## 2023-10-14 NOTE — ASSESSMENT & PLAN NOTE
On atorvastatin w/ last lipid panel (Jan 2023): cholesterol 155, HDL 37, LDL 62, TG 80.   -- Continue home statin

## 2023-10-14 NOTE — ASSESSMENT & PLAN NOTE
"Hx of chronic cough with recurrent bronchospasm w/ prior workups unrevealing thus far. Reported worsening sx over past few days that was responsive to home inhalers, nebulizers, nor two doses of prednisone (20mg daily x2). No known sick contacts. Hypoxic (SpO2 89%) on admission, briefly requiring supplemental O2. CXR showed central peribronchial findings suggestive of inflammation (vs infxn); otherwise clear w/o focal consolidation. COVID/Flu/RSV negative. Afebrile and WBC & procalcitonin wnl. Received dose of CTX & azithromycin, along w/ IV solumedrol & nebulizer treatments w/ subsequent marked improvement in symptoms, and resolution of hypoxia. Given no e/o bacterial infxn, or dx of COPD/asthma, abx d/c'ed.   -- Continue prednisone 40mg x 5 days  -- Continue home nebulizers and inhalers  -- Continue home Rx regimen for mgmt of "recurrent bronchospasm"  -- Start Mucinex BID (at least until seen by pulmonology)  -- Follow-up w/ outpatient pulmonology (has appt for next week)  "

## 2023-10-14 NOTE — ASSESSMENT & PLAN NOTE
Known hx of CAD w/ prior STEMI, s/p stent placement (May 2022). On admission, troponin wnl & EKG w/o acute ischemic changes.   -- Continue home cardiac package of ASA, ticagrelor & atorvastatin (along w/ Entresto & metoprolol)   -- Follow-up with outpatient cardiology

## 2023-10-14 NOTE — ASSESSMENT & PLAN NOTE
Downtrending Hgb over past few months. Baseline previously 13-14, but now down to ~12 w/ MCV 70s. Reports one potential episode of melena a month or so ago, but otherwise denies melena or hematochezia. No other e/o active bleeding. Reports last colonoscopy was ~10 years ago (records not available per chart review). Reports regular red meat intake.   -- Iron studies pending   -- Follow-up with PCP re: iron studies results, referral for colonoscopy, and any other potential anemia workup.

## 2023-10-16 LAB
BACTERIA SPEC AEROBE CULT: NORMAL
BACTERIA SPEC AEROBE CULT: NORMAL
GRAM STN SPEC: NORMAL

## 2023-10-17 ENCOUNTER — OFFICE VISIT (OUTPATIENT)
Dept: PULMONOLOGY | Facility: CLINIC | Age: 63
End: 2023-10-17
Payer: COMMERCIAL

## 2023-10-17 VITALS
HEIGHT: 64 IN | BODY MASS INDEX: 26.29 KG/M2 | WEIGHT: 154 LBS | SYSTOLIC BLOOD PRESSURE: 100 MMHG | DIASTOLIC BLOOD PRESSURE: 65 MMHG | HEART RATE: 59 BPM | RESPIRATION RATE: 17 BRPM | OXYGEN SATURATION: 96 %

## 2023-10-17 DIAGNOSIS — R05.3 CHRONIC COUGH: Primary | ICD-10-CM

## 2023-10-17 DIAGNOSIS — J42 CHRONIC BRONCHITIS, UNSPECIFIED CHRONIC BRONCHITIS TYPE: ICD-10-CM

## 2023-10-17 PROCEDURE — 99215 PR OFFICE/OUTPT VISIT, EST, LEVL V, 40-54 MIN: ICD-10-PCS | Mod: 25,S$GLB,,

## 2023-10-17 PROCEDURE — 1159F PR MEDICATION LIST DOCUMENTED IN MEDICAL RECORD: ICD-10-PCS | Mod: CPTII,S$GLB,,

## 2023-10-17 PROCEDURE — 4010F ACE/ARB THERAPY RXD/TAKEN: CPT | Mod: CPTII,S$GLB,,

## 2023-10-17 PROCEDURE — 3044F PR MOST RECENT HEMOGLOBIN A1C LEVEL <7.0%: ICD-10-PCS | Mod: CPTII,S$GLB,,

## 2023-10-17 PROCEDURE — 94664 PR DEMO &/OR EVAL,PT USE,AEROSOL DEVICE: ICD-10-PCS | Mod: S$GLB,,,

## 2023-10-17 PROCEDURE — 3044F HG A1C LEVEL LT 7.0%: CPT | Mod: CPTII,S$GLB,,

## 2023-10-17 PROCEDURE — 94664 DEMO&/EVAL PT USE INHALER: CPT | Mod: S$GLB,,,

## 2023-10-17 PROCEDURE — 3008F BODY MASS INDEX DOCD: CPT | Mod: CPTII,S$GLB,,

## 2023-10-17 PROCEDURE — 99999 PR PBB SHADOW E&M-EST. PATIENT-LVL V: ICD-10-PCS | Mod: PBBFAC,,,

## 2023-10-17 PROCEDURE — 3074F PR MOST RECENT SYSTOLIC BLOOD PRESSURE < 130 MM HG: ICD-10-PCS | Mod: CPTII,S$GLB,,

## 2023-10-17 PROCEDURE — 1159F MED LIST DOCD IN RCRD: CPT | Mod: CPTII,S$GLB,,

## 2023-10-17 PROCEDURE — 3078F PR MOST RECENT DIASTOLIC BLOOD PRESSURE < 80 MM HG: ICD-10-PCS | Mod: CPTII,S$GLB,,

## 2023-10-17 PROCEDURE — 3074F SYST BP LT 130 MM HG: CPT | Mod: CPTII,S$GLB,,

## 2023-10-17 PROCEDURE — 4010F PR ACE/ARB THEARPY RXD/TAKEN: ICD-10-PCS | Mod: CPTII,S$GLB,,

## 2023-10-17 PROCEDURE — 3008F PR BODY MASS INDEX (BMI) DOCUMENTED: ICD-10-PCS | Mod: CPTII,S$GLB,,

## 2023-10-17 PROCEDURE — 99999 PR PBB SHADOW E&M-EST. PATIENT-LVL V: CPT | Mod: PBBFAC,,,

## 2023-10-17 PROCEDURE — 3078F DIAST BP <80 MM HG: CPT | Mod: CPTII,S$GLB,,

## 2023-10-17 PROCEDURE — 99215 OFFICE O/P EST HI 40 MIN: CPT | Mod: 25,S$GLB,,

## 2023-10-17 RX ORDER — BUDESONIDE, GLYCOPYRROLATE, AND FORMOTEROL FUMARATE 160; 9; 4.8 UG/1; UG/1; UG/1
2 AEROSOL, METERED RESPIRATORY (INHALATION) 2 TIMES DAILY
Qty: 10.7 G | Refills: 6 | Status: SHIPPED | OUTPATIENT
Start: 2023-10-17

## 2023-10-17 NOTE — PATIENT INSTRUCTIONS
Start taking new inhaler called Breztri, 2 puffs twice a day, rinse mouth after using due to risk for thrush; if mouth or tongue has white sores contact clinic. Full explanation of inhaler technique using demo inhaler performed during the visit.     CT chest to evaluate lungs for chronic cough    Continue using nebulizer treatments and albuterol rescue inhaler as needed    Can finish course of prednisone    Continue taking mucinex twice a day for cough    Recommend GI referral if lung workup is negative

## 2023-10-17 NOTE — PROGRESS NOTES
Patient ID:  Gordon Dewitt is a 63 y.o. male    Hospital Follow Up    Subjective:       Reviewed Dr. Chowdhury's note 03/31/2023:  HPI:  Gordon Dewitt is a 63 y.o. male who presents with chronic cough.     Reports cough for months.   Cough, initially dry but now with mucous.   ED last week and was treated with doxycyline and steroids.   He has not noticed much improvement after treatment.     Prescribed inhalers, but doesn't take them.   The dry powdered inhaler made him feel like his throat was closing.     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       Interval History:  10/17/2023:  Gordon Dewitt is a 63 y.o. male who presents in office for hospital follow up of asthma exacerbation/bronchitis. Previously seen by pulmonologist Dr. Chowdhury in March 2023. The chief complaint problem is new to me. Complaints of a chronic cough with clear sputum production worse at night with nighttime awakenings. Associated with shortness of breath and wheezing; onset about a year. Worse at night when laying down. Taking symbicort twice a day, albuterol rescue inhaler and nebulizer as needed. Unsure if inhalers have been effective however reports relief after doing nebulizer treatments. Reports being treated for aphthous ulcers after starting ICS/LABA.     Has had several hospitalizations for bronchitis with courses of prednisone therapy. Recent hospitalization at [facility] 10/12/2023-10/13/2023 for asthma exacerbation. Per discharge summary was treated with duo nebs, inhalers, steroids, antibiotics, and brief supplemental O2. Discharged with course of PO steroids. Continues to have persistent symptoms. Has had workups per pulmonology, cardiology, ENT, allergy & rheumatology that have been unrevealing.       Past Medical History:   Diagnosis Date    CAD (coronary artery disease)     CHF (congestive heart failure), NYHA class I     Hyperlipidemia     Myocardial  "infarction        Performance Status:The patient's activity level is functions out of house.          Review of Systems   Constitutional:  Negative for fever, chills and fatigue.   HENT:  Negative for postnasal drip and sinus pressure.    Respiratory:  Positive for cough, chest tightness, shortness of breath and wheezing.    Cardiovascular:  Negative for chest pain, palpitations and leg swelling.   Skin:  Negative for rash.   Neurological:  Negative for dizziness, weakness, light-headedness and headaches.       Objective:     Vitals:    10/17/23 1323   BP: 100/65   Pulse: (!) 59   Resp: 17   SpO2: 96%   Weight: 69.9 kg (154 lb)   Height: 5' 4" (1.626 m)           Physical Exam   Constitutional: He is oriented to person, place, and time. He appears well-developed and well-nourished.   HENT:   Head: Normocephalic.   Cardiovascular: Normal rate, regular rhythm and normal heart sounds.   Pulmonary/Chest: Normal expansion and effort normal. No respiratory distress. He has wheezes. He has no rhonchi.   Musculoskeletal:         General: Normal range of motion.      Cervical back: Normal range of motion.   Neurological: He is alert and oriented to person, place, and time.   Psychiatric: He has a normal mood and affect. His behavior is normal. Judgment and thought content normal.       Pertinent Studies Reviewed & Interpreted:     Labs reviewed       Lab Results   Component Value Date    WBC 4.08 10/13/2023    RBC 5.24 10/13/2023    HGB 11.9 (L) 10/13/2023    HCT 40.6 10/13/2023    MCV 78 (L) 10/13/2023    MCH 22.7 (L) 10/13/2023    MCHC 29.3 (L) 10/13/2023    RDW 16.5 (H) 10/13/2023     10/13/2023    MPV 11.8 10/13/2023    GRAN 3.2 10/13/2023    GRAN 78.8 (H) 10/13/2023    LYMPH 0.7 (L) 10/13/2023    LYMPH 17.2 (L) 10/13/2023    MONO 0.1 (L) 10/13/2023    MONO 2.9 (L) 10/13/2023    EOS 0.0 10/13/2023    BASO 0.01 10/13/2023    EOSINOPHIL 0.2 10/13/2023    BASOPHIL 0.2 10/13/2023      Latest Reference Range & Units " 23 07:33 23 09:33 23 12:44   Eos # 0.0 - 0.5 K/uL 0.3 0.2 0.4       Personal Diagnostic Review and Interpretation  Radiographs (ct chest and cxr) images personally reviewed: view by direct vision    CTA Chest Non-Coronary (PE Studies) 23: Clear lungs. No pleural effusion or thickening.     X-Ray Chest 10/12/23: The lungs are symmetrically expanded bilaterally with mildly coarse interstitial attenuation, accentuated by habitus. There is bilateral peribronchial thickening.. No large focal consolidation seen. There is no pneumothorax.     Impression:  1. Central peribronchial findings are concerning for peribronchial inflammation or infection.  Correlation is advised.    Pulmonary Function Tests: results reviewed  2023:  FVC: 93.5% predicted  FEV1: 98.4% predicted  FEV1/FVC:  82%  T.2% predicted  DLCO: 93.3% predicted  Spirometry is normal. Spirometry remains unimproved following bronchodilator. Lung volume determination is normal. DLCO is normal.      Transthoracic echo (TTE) complete with contrast 10/13/23: There is mildly reduced systolic function with a visually estimated ejection fraction of 40 - 45% There is normal diastolic function. Systolic function is normal.     Assessment & Plan:         Problem List Items Addressed This Visit          Pulmonary    Chronic cough - Primary     -Etiology unclear, multiple ER visits, recently hospitalized  -Has had workups per pulmonology, cardiology, ENT, allergy & rheumatology that have been unrevealing  -Could possibly be reactive airways disease, asthma vs silent reflux?  -Has history of eosinophilia, is on high dose inhaled steroid, however will switch and trial triple therapy inhaler  -Full explanation of inhaler technique using demo inhaler performed during the visit  -Will consider discussing biologic therapy if cough does not improve after triple therapy  -PFTs in February were normal, no evidence of lung obstruction  -Recent CXR  showed mildly coarse interstitial attenuation, bilateral peribronchial thickening  -Will order CT chest to further evaluate  -Will consider GI referral is lung workup is negative         Relevant Medications    budesonide-glycopyr-formoterol (BREZTRI AEROSPHERE) 160-9-4.8 mcg/actuation HFAA    Other Relevant Orders    CT Chest Without Contrast     Other Visit Diagnoses       Chronic bronchitis, unspecified chronic bronchitis type        Relevant Medications    budesonide-glycopyr-formoterol (BREZTRI AEROSPHERE) 160-9-4.8 mcg/actuation HFAA    Other Relevant Orders    CT Chest Without Contrast                       Follow up if symptoms worsen or fail to improve.    Discussed with patient above for education the following:      Patient Instructions   Start taking new inhaler called Breztri, 2 puffs twice a day, rinse mouth after using due to risk for thrush; if mouth or tongue has white sores contact clinic. Full explanation of inhaler technique using demo inhaler performed during the visit.     CT chest to evaluate lungs for chronic cough    Continue using nebulizer treatments and albuterol rescue inhaler as needed    Can finish course of prednisone    Continue taking mucinex twice a day for cough    Recommend GI referral if lung workup is negative           María Jaramillo NP    40 minutes of total time spent on the encounter, which includes face to face time and non-face to face time preparing to see the patient (eg, review of tests), Obtaining and/or reviewing separately obtained history, Documenting clinical information in the electronic or other health record, Independently interpreting results (not separately reported) and communicating results to the patient/family/caregiver, or Care coordination (not separately reported).

## 2023-10-17 NOTE — ASSESSMENT & PLAN NOTE
-Etiology unclear, multiple ER visits, recently hospitalized  -Has had workups per pulmonology, cardiology, ENT, allergy & rheumatology that have been unrevealing  -Could possibly be reactive airways disease, asthma vs silent reflux?  -Has history of eosinophilia, is on high dose inhaled steroid, however will switch and trial triple therapy inhaler  -Full explanation of inhaler technique using demo inhaler performed during the visit  -Will consider discussing biologic therapy if cough does not improve after triple therapy  -PFTs in February were normal, no evidence of lung obstruction  -Recent CXR showed mildly coarse interstitial attenuation, bilateral peribronchial thickening  -Will order CT chest to further evaluate  -Will consider GI referral is lung workup is negative

## 2023-10-18 ENCOUNTER — PATIENT MESSAGE (OUTPATIENT)
Dept: PULMONOLOGY | Facility: CLINIC | Age: 63
End: 2023-10-18
Payer: COMMERCIAL

## 2023-10-19 ENCOUNTER — TELEPHONE (OUTPATIENT)
Dept: PULMONOLOGY | Facility: CLINIC | Age: 63
End: 2023-10-19
Payer: COMMERCIAL

## 2023-10-19 NOTE — TELEPHONE ENCOUNTER
----- Message from Santo Hicks sent at 10/19/2023 11:44 AM CDT -----  .Type:  Needs Medical Advice    Who Called: Hilda with [employer]  [place]    Would the patient rather a call back or a response via [facility]? Call back  Best Call Back Number:  opt 2 then opt 3  Additional Information:     Pt would like for his order faxed to  to [facility]

## 2023-10-30 ENCOUNTER — TELEPHONE (OUTPATIENT)
Dept: PULMONOLOGY | Facility: CLINIC | Age: 63
End: 2023-10-30
Payer: COMMERCIAL

## 2023-10-30 NOTE — TELEPHONE ENCOUNTER
----- Message from Janie Sanchez sent at 10/30/2023 12:21 PM CDT -----  Type:  Needs Medical Advice    Who Called: pt  Would the patient rather a call back or a response via [facility]? call  Best Call Back Number: [phone]  Additional Information: pt following up on requests for CT scan to be set outside of [facility] would like to know whats going on

## 2023-11-05 ENCOUNTER — PATIENT MESSAGE (OUTPATIENT)
Dept: PULMONOLOGY | Facility: CLINIC | Age: 63
End: 2023-11-05
Payer: COMMERCIAL

## 2023-11-15 NOTE — TELEPHONE ENCOUNTER
This was forwarded to me from patient's prior pulmonologist at [facility]. Can you find out what paperwork patient is referring to for the CT that I ordered last month?

## 2023-11-20 DIAGNOSIS — E78.5 HYPERLIPIDEMIA, UNSPECIFIED HYPERLIPIDEMIA TYPE: ICD-10-CM

## 2023-11-20 RX ORDER — ATORVASTATIN CALCIUM 40 MG/1
40 TABLET, FILM COATED ORAL NIGHTLY
Qty: 90 TABLET | Refills: 0 | Status: SHIPPED | OUTPATIENT
Start: 2023-11-20

## 2023-11-20 NOTE — TELEPHONE ENCOUNTER
----- Message from Aida Bull sent at 11/18/2023 10:21 AM CST -----  Contact: [facility]   Requesting an RX refill or new RX.  Is this a refill or new RX: new  RX name and strength (copy/paste from chart):  atorvastatin (LIPITOR) 40 MG tablet  Is this a 30 day or 90 day RX: 90  Pharmacy name and phone # (copy/paste from chart):    [facility] - [facility] [id number] [facility] [place] [address]  Phone: [phone] Fax: [phone]

## 2023-11-21 ENCOUNTER — PATIENT MESSAGE (OUTPATIENT)
Dept: CARDIOLOGY | Facility: CLINIC | Age: 63
End: 2023-11-21
Payer: COMMERCIAL

## 2023-11-30 ENCOUNTER — TELEPHONE (OUTPATIENT)
Dept: PULMONOLOGY | Facility: CLINIC | Age: 63
End: 2023-11-30
Payer: COMMERCIAL

## 2023-11-30 NOTE — TELEPHONE ENCOUNTER
----- Message from Mary Fernandez sent at 11/30/2023  3:26 PM CST -----  Pt Note to Doctor    Who called: [facility]  Best call back #: [phone](Sharon call) [phone](fax)  Note: caller said they need the office notes for pt in order to obtain authorization for pt to have chest CT scan with them; says pt is scheduled for 12/2/23 with them

## 2023-12-14 ENCOUNTER — OFFICE VISIT (OUTPATIENT)
Dept: ALLERGY | Facility: CLINIC | Age: 63
End: 2023-12-14
Payer: COMMERCIAL

## 2023-12-14 VITALS
BODY MASS INDEX: 28.23 KG/M2 | SYSTOLIC BLOOD PRESSURE: 105 MMHG | OXYGEN SATURATION: 100 % | WEIGHT: 164.44 LBS | HEART RATE: 61 BPM | DIASTOLIC BLOOD PRESSURE: 67 MMHG

## 2023-12-14 DIAGNOSIS — J42 CHRONIC BRONCHITIS, UNSPECIFIED CHRONIC BRONCHITIS TYPE: ICD-10-CM

## 2023-12-14 DIAGNOSIS — R06.02 SHORTNESS OF BREATH: ICD-10-CM

## 2023-12-14 DIAGNOSIS — R05.3 CHRONIC COUGH: Primary | ICD-10-CM

## 2023-12-14 DIAGNOSIS — R06.2 WHEEZING: ICD-10-CM

## 2023-12-14 PROCEDURE — 3078F PR MOST RECENT DIASTOLIC BLOOD PRESSURE < 80 MM HG: ICD-10-PCS | Mod: CPTII,S$GLB,, | Performed by: STUDENT IN AN ORGANIZED HEALTH CARE EDUCATION/TRAINING PROGRAM

## 2023-12-14 PROCEDURE — 99215 OFFICE O/P EST HI 40 MIN: CPT | Mod: S$GLB,,, | Performed by: STUDENT IN AN ORGANIZED HEALTH CARE EDUCATION/TRAINING PROGRAM

## 2023-12-14 PROCEDURE — 99999 PR PBB SHADOW E&M-EST. PATIENT-LVL IV: ICD-10-PCS | Mod: PBBFAC,,, | Performed by: STUDENT IN AN ORGANIZED HEALTH CARE EDUCATION/TRAINING PROGRAM

## 2023-12-14 PROCEDURE — 4010F PR ACE/ARB THEARPY RXD/TAKEN: ICD-10-PCS | Mod: CPTII,S$GLB,, | Performed by: STUDENT IN AN ORGANIZED HEALTH CARE EDUCATION/TRAINING PROGRAM

## 2023-12-14 PROCEDURE — 1160F PR REVIEW ALL MEDS BY PRESCRIBER/CLIN PHARMACIST DOCUMENTED: ICD-10-PCS | Mod: CPTII,S$GLB,, | Performed by: STUDENT IN AN ORGANIZED HEALTH CARE EDUCATION/TRAINING PROGRAM

## 2023-12-14 PROCEDURE — 3074F PR MOST RECENT SYSTOLIC BLOOD PRESSURE < 130 MM HG: ICD-10-PCS | Mod: CPTII,S$GLB,, | Performed by: STUDENT IN AN ORGANIZED HEALTH CARE EDUCATION/TRAINING PROGRAM

## 2023-12-14 PROCEDURE — 4010F ACE/ARB THERAPY RXD/TAKEN: CPT | Mod: CPTII,S$GLB,, | Performed by: STUDENT IN AN ORGANIZED HEALTH CARE EDUCATION/TRAINING PROGRAM

## 2023-12-14 PROCEDURE — 3008F PR BODY MASS INDEX (BMI) DOCUMENTED: ICD-10-PCS | Mod: CPTII,S$GLB,, | Performed by: STUDENT IN AN ORGANIZED HEALTH CARE EDUCATION/TRAINING PROGRAM

## 2023-12-14 PROCEDURE — 1159F PR MEDICATION LIST DOCUMENTED IN MEDICAL RECORD: ICD-10-PCS | Mod: CPTII,S$GLB,, | Performed by: STUDENT IN AN ORGANIZED HEALTH CARE EDUCATION/TRAINING PROGRAM

## 2023-12-14 PROCEDURE — 1159F MED LIST DOCD IN RCRD: CPT | Mod: CPTII,S$GLB,, | Performed by: STUDENT IN AN ORGANIZED HEALTH CARE EDUCATION/TRAINING PROGRAM

## 2023-12-14 PROCEDURE — 3008F BODY MASS INDEX DOCD: CPT | Mod: CPTII,S$GLB,, | Performed by: STUDENT IN AN ORGANIZED HEALTH CARE EDUCATION/TRAINING PROGRAM

## 2023-12-14 PROCEDURE — 3078F DIAST BP <80 MM HG: CPT | Mod: CPTII,S$GLB,, | Performed by: STUDENT IN AN ORGANIZED HEALTH CARE EDUCATION/TRAINING PROGRAM

## 2023-12-14 PROCEDURE — 99215 PR OFFICE/OUTPT VISIT, EST, LEVL V, 40-54 MIN: ICD-10-PCS | Mod: S$GLB,,, | Performed by: STUDENT IN AN ORGANIZED HEALTH CARE EDUCATION/TRAINING PROGRAM

## 2023-12-14 PROCEDURE — 1160F RVW MEDS BY RX/DR IN RCRD: CPT | Mod: CPTII,S$GLB,, | Performed by: STUDENT IN AN ORGANIZED HEALTH CARE EDUCATION/TRAINING PROGRAM

## 2023-12-14 PROCEDURE — 3044F HG A1C LEVEL LT 7.0%: CPT | Mod: CPTII,S$GLB,, | Performed by: STUDENT IN AN ORGANIZED HEALTH CARE EDUCATION/TRAINING PROGRAM

## 2023-12-14 PROCEDURE — 99999 PR PBB SHADOW E&M-EST. PATIENT-LVL IV: CPT | Mod: PBBFAC,,, | Performed by: STUDENT IN AN ORGANIZED HEALTH CARE EDUCATION/TRAINING PROGRAM

## 2023-12-14 PROCEDURE — 3044F PR MOST RECENT HEMOGLOBIN A1C LEVEL <7.0%: ICD-10-PCS | Mod: CPTII,S$GLB,, | Performed by: STUDENT IN AN ORGANIZED HEALTH CARE EDUCATION/TRAINING PROGRAM

## 2023-12-14 PROCEDURE — 3074F SYST BP LT 130 MM HG: CPT | Mod: CPTII,S$GLB,, | Performed by: STUDENT IN AN ORGANIZED HEALTH CARE EDUCATION/TRAINING PROGRAM

## 2023-12-14 NOTE — PROGRESS NOTES
ALLERGY & IMMUNOLOGY CLINIC - FOLLOW UP     HISTORY OF PRESENT ILLNESS     Patient ID: Gordon Dewitt is a 63 y.o. male    CC: chronic cough, shortness of breath, wheezing.    HPI: Gordon Dewitt is a 63 y.o. male with a history of CHF, CAD, and recurrent aphthous ulcers, following up chronic cough and respiratory symptoms.     He was admitted in October for a flare of his respiratory symptoms/bronchitis with shortness of breath and wheezing. He saw pulm shortly after and was switched to breztri.   He is now on breztri 160 mcg 2 puffs BID. He says he guess it is helping, because he feels better now. He says the oral steroid also might have helped. He says he stopped using the nebulizer. He says he uses his albuterol inhaler once per week or less. He says the cough is much less then it was at the time. He got a CT chest about 2 weeks ago, but it was outside of [facility] (due to insurance), so I am currently unable to see report.    He is asking about biologic medications.   He remains on the montelukast nightly.  He thinks he is still on the levocetirizine.   He says he stopped taking the flonase. When asked why he stopped, he said he doesn't know. He doesn't think it was helpful.     He says his aphthous ulcers were painful last week.  He says sometimes they are better and sometimes they worse. He says they take about a week to get better. He uses the magic mouthwash when he gets the ulcers, he is unsure if it helps. The ulcers are very painful when he is having a flare up. It hurts to eat and swallow when he is having a flare up.      MEDICAL HISTORY     Vaccines:   Immunization History   Administered Date(s) Administered    COVID-19, MRNA, LN-S, PF (Pfizer) (Purple Cap) 04/04/2021, 05/02/2021    Influenza - Quadrivalent - PF *Preferred* (6 months and older) 09/29/2023    Zoster Recombinant 09/27/2022, 12/06/2022     Medical Hx:   Patient Active Problem List   Diagnosis    HFrEF (heart failure with reduced ejection  fraction)    Coronary artery disease involving native coronary artery of native heart    History of ST elevation myocardial infarction (STEMI)    Hyperlipidemia    S/P drug eluting coronary stent placement    Recurrent bronchospasm    Seasonal allergic rhinitis    Cough due to bronchospasm    Bronchitis, acute, with bronchospasm    Microcytic anemia    Chronic cough     Surgical Hx:   History reviewed. No pertinent surgical history.    Medications:   Current Outpatient Medications on File Prior to Visit   Medication Sig Dispense Refill    albuterol (PROVENTIL/VENTOLIN HFA) 90 mcg/actuation inhaler 2 puffs every 4 (four) hours as needed.      aspirin 81 MG Chew Take 81 mg by mouth once daily.      atorvastatin (LIPITOR) 40 MG tablet Take 1 tablet (40 mg total) by mouth every evening. 90 tablet 0    azelastine (ASTELIN) 137 mcg (0.1 %) nasal spray 1 spray (137 mcg total) by Nasal route 2 (two) times daily as needed for Rhinitis.      benzocaine/menthol/zinc chlor (ORAJEL 3X MOUTH SORES MM) by Mucous Membrane route. Apply topically to mouth blisters daily.      BRILINTA 90 mg tablet Take 1 tablet (90 mg total) by mouth 2 (two) times daily. 60 tablet 6    budesonide-formoterol 160-4.5 mcg (SYMBICORT) 160-4.5 mcg/actuation HFAA Inhale 2 puffs into the lungs every 12 (twelve) hours. Controller 10.2 g 11    budesonide-glycopyr-formoterol (BREZTRI AEROSPHERE) 160-9-4.8 mcg/actuation HFAA Inhale 2 puffs into the lungs 2 (two) times daily. 10.7 g 6    dapagliflozin (FARXIGA) 10 mg tablet Take 1 tablet (10 mg total) by mouth once daily at 6am. 90 tablet 3    ENTRESTO 24-26 mg per tablet Take 1 tablet by mouth 2 (two) times daily. 60 tablet 6    fluticasone propionate (FLONASE) 50 mcg/actuation nasal spray 1 spray (50 mcg total) by Each Nostril route daily as needed for Rhinitis.      fluticasone-salmeterol diskus inhaler 250-50 mcg Inhale 1 puff into the lungs 2 (two) times daily. Controller 1 each 11    ibuprofen  (ADVIL,MOTRIN) 200 MG tablet Take 400 mg by mouth daily as needed for Pain.      metoprolol succinate (TOPROL-XL) 25 MG 24 hr tablet Take 1 tablet (25 mg total) by mouth once daily. HOLD THIS MEDICATION UNTIL YOU COMPLETE STEROIDS.  IT CAN CONTRIBUTE TO BRONCHOSPASMS 90 tablet 3    montelukast (SINGULAIR) 10 mg tablet Take 10 mg by mouth once daily.      omega-3 fatty acids/fish oil (FISH OIL-OMEGA-3 FATTY ACIDS) 300-1,000 mg capsule Take 1 capsule by mouth once a week.      albuterol-ipratropium (DUO-NEB) 2.5 mg-0.5 mg/3 mL nebulizer solution Take 3 mLs by nebulization every 4 (four) hours as needed for Wheezing. Rescue 90 mL 0    levocetirizine (XYZAL) 5 MG tablet Take 1 tablet (5 mg total) by mouth every evening. 30 tablet 11    nitroGLYCERIN (NITROSTAT) 0.4 MG SL tablet Place 0.4 mg under the tongue.       No current facility-administered medications on file prior to visit.     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   Infection Hx: Denies frequent infections requiring antibiotics. No history of pneumonia.     PHYSICAL EXAM     VS: /67 (BP Location: Right arm, Patient Position: Sitting, BP Method: Large (Automatic))   Pulse 61   Wt 74.6 kg (164 lb 7.4 oz)   SpO2 100%   BMI 28.23 kg/m²   GENERAL: Alert, NAD, well-appearing  EYES: EOMI, no conjunctival injection, no discharge, no infraorbital shiners  NOSE: NT 2+ B/L, no stringing mucus, no polyps visualized  ORAL: MMM, no thrush, + 2 aphthous ulcers underneath tongue  LUNGS: CTAB, no w/r/c, no increased WOB  HEART: RRR, normal S1/S2, no m/g/r  EXTREMITIES: No LE  edema  DERM: no rashes  NEURO: normal speech, no facial asymmetry     LABORATORY STUDIES     Component      Latest Ref Rng 10/12/2023 10/13/2023   WBC      3.90 - 12.70 K/uL 9.12  4.08    RBC      4.60 - 6.20 M/uL 5.57  5.24    Hemoglobin      14.0 - 18.0 g/dL 12.8 (L)  11.9 (L)    Hematocrit      40.0 - 54.0 % 42.5  40.6    MCV      82 - 98 fL 76 (L)  78 (L)    MCH      27.0 - 31.0 pg 23.0 (L)  22.7 (L)    MCHC      32.0 - 36.0 g/dL 30.1 (L)  29.3 (L)    RDW      11.5 - 14.5 % 16.9 (H)  16.5 (H)    Platelet Count      150 - 450 K/uL 263  299    MPV      9.2 - 12.9 fL 10.9  11.8    Immature Granulocytes      0.0 - 0.5 % 0.5  0.7 (H)    Gran # (ANC)      1.8 - 7.7 K/uL 7.6  3.2    Immature Grans (Abs)      0.00 - 0.04 K/uL 0.05 (H)  0.03    Lymph #      1.0 - 4.8 K/uL 0.7 (L)  0.7 (L)    Mono #      0.3 - 1.0 K/uL 0.6  0.1 (L)    Eos #      0.0 - 0.5 K/uL 0.1  0.0    Baso #      0.00 - 0.20 K/uL 0.06  0.01    Differential Method Automated  Automated    Sodium      136 - 145 mmol/L 139  137    Potassium      3.5 - 5.1 mmol/L 3.5  4.6    Chloride      95 - 110 mmol/L 104  103    CO2      23 - 29 mmol/L 23  24    Glucose      70 - 110 mg/dL 136 (H)  129 (H)    BUN      8 - 23 mg/dL 15  15    Creatinine      0.5 - 1.4 mg/dL 1.0  0.9    Calcium      8.7 - 10.5 mg/dL 9.0  9.2    PROTEIN TOTAL      6.0 - 8.4 g/dL 7.3  7.1    Albumin      3.5 - 5.2 g/dL 3.7  3.5    BILIRUBIN TOTAL      0.1 - 1.0 mg/dL 0.3  0.3    ALP      55 - 135 U/L 92  91    AST      10 - 40 U/L 16  14    ALT      10 - 44 U/L 17  16    Iron      45 - 160 ug/dL  26 (L)    Transferrin      200 - 375 mg/dL  291    TIBC      250 - 450 ug/dL  431    Saturated Iron      20 - 50 %  6 (L)    BNP      0 - 99 pg/mL 78     Troponin I      0.000 - 0.026 ng/mL <0.006     Ferritin      20.0 - 300.0 ng/mL  23       7/18/23:     7/18/23 - IgG4 level reported to be elevated at 257.5 mg/dL.    Component      Latest Ref Rng & Units 6/6/2023 4/14/2023   IgE      0 - 100  IU/mL  <35   HIV 1/2 Ag/Ab      Non-reactive Non-reactive       ALLERGEN TESTING     Immunocaps:   Component      Latest Ref Rng & Units 6/21/2023 6/21/2023 6/21/2023          10:34 AM 10:34 AM 10:34 AM   D. farinae      <0.10 kU/L   <0.10   D. farinae Class         CLASS 0   Mite Dust Pteronyssinus IgE      <0.10 kU/L   <0.10   D. pteronyssinus Class         CLASS 0   BERMUDA GRASS      <0.10 kU/L   <0.10   Bermuda Grass Class         CLASS 0   Ash Grass      <0.10 kU/L   <0.10   Ash Grass Class         CLASS 0   Monroe IgE      <0.10 kU/L   <0.10   [place] Class         CLASS 0   Plantain      <0.10 kU/L   <0.10   English Plantain Class         CLASS 0   White Oak(Quercus alba) IgE      <0.10 kU/L   <0.10   [place], Class         CLASS 0   Pecan [place] Tree      <0.10 kU/L   <0.10   Pecan, Class         CLASS 0   Marshelder IgE      <0.10 kU/L   <0.10   Marshelder Class         CLASS 0   Ragweed, Western IgE      <0.10 kU/L   <0.10   Ragweed, Western, Class         CLASS 0   Alternaria alternata      <0.10 kU/L   <0.10   Altern. alternata Class         CLASS 0   Aspergillus Fumigatus IgE      <0.10 kU/L   <0.10   A. fumigatus Class         CLASS 0   Cat Dander      <0.10 kU/L   <0.10   Cat Epithelium Class         CLASS 0   Cockroach, IgE      <0.10 kU/L CLASS 0 <0.10    Dog Dander, IgE      <0.10 kU/L   <0.10   Dog Dander Class         CLASS 0   WHITE FOSTER TREE      <0.10 kU/L   <0.10   White Foster Class         CLASS 0   Allergen Maple ([place]) IgE      <0.10 kU/L   <0.10   Allergen Maple ([place]) Class         CLASS 0   L[place]aster IgE      <0.10 kU/L   <0.10   L[place]aster Class         CLASS 0   Russian Thistle IgE      <0.10 kU/L   <0.10   Russian Thistle Class         CLASS 0   Allergen Sheep [place] ([place]) IgE      <0.10 kU/L   <0.10   Allergen Sheep [place] ([place]) Class         CLASS 0   BAHIA GRASS      <0.10 kU/L   <0.10   [place]ia Class         CLASS 0   ALEX GRASS      <0.10 kU/L   <0.10    [place] Grass Class         CLASS 0   Silver Birch IgE      <0.10 kU/L   <0.10   Silver Birch Class         CLASS 0   Allergen Candida albicans IgE      <0.10 kU/L   <0.10   Allergen Candida albicans Class         CLASS 0   Cladosporium, IgE      <0.10 kU/L   <0.10   Cladosporium Class         CLASS 0   Curvularia lunata      <0.10 kU/L   <0.10   Curvularia Lunata Class         CLASS 0   Epicoccum purpurascens, IgE      <0.10 kU/L   <0.10   Epicoccum pupurascens Class         CLASS 0   Helminthosporium Halodes IgE      <0.10 kU/L   <0.10   Helminthosporium Class         CLASS 0   Mucor racemosus, IgE      <0.10 kU/L   <0.10   Mucor racemosus Class         CLASS 0   Penicillium, IgE      <0.10 kU/L   <0.10   Penicillium Class         CLASS 0      PULMONARY FUNCTION TESTING     Date 2/7/23:  FVC:         94%ile -> + 11%  FEV1:         98%ile -> + 6%  FEV1/FVC: 82%  FEF 25-75: 117%ile  DLCO:        95%ile  Interpretation: Spirometry is normal. Spirometry remains unimproved following bronchodilator. Lung volume determination is normal. DLCO is normal.     IMAGING & OTHER DIAGNOSTICS     CXR 10/12/23:  FINDINGS:  The cardiomediastinal silhouette is not enlarged.  There is prominence of the right paratracheal stripe, stable..  There is no pleural effusion.  The trachea is midline.  The lungs are symmetrically expanded bilaterally with mildly coarse interstitial attenuation, accentuated by habitus.  There is bilateral peribronchial thickening..  No large focal consolidation seen.  There is no pneumothorax.  The osseous structures are remarkable for degenerative change..  Impression:  1. Central peribronchial findings are concerning for peribronchial inflammation or infection.  Correlation is advised.    CTA chest 6/5/23:  Impression:  No evidence of pulmonary thromboemboli on this limited exam.  No RV strain.  Lungs are clear.    CXR 6/5/23:  FINDINGS:  Cardiomediastinal silhouettewithin normal limits for age.  Lungs  grossly clear within limitations of portable technique  Pleura, diaphragm, and thoracic cage grossly unremarkable.  Impression:  No acute cardiopulmonary disease    CXR 3/23/23:  FINDINGS: Heart size and mediastinal contour are normal.  No focal pneumonia. No pulmonary edema, pleural effusion or pneumothorax.  No acute osseous findings.     CHART REVIEW     Reviewed rheum note, discharge summary, pulm note, labs, imaging.      ASSESSMENT & PLAN     Gordon Dewitt is a 63 y.o. male with     # Chronic cough, chronic bronchitis: Symptoms on and off since 9/2022. He can get wheezing and shortness of breath with the cough. He would only occasionally notice rhinitis and was unsure about post nasal drip. Didn't find flonase and azelastine nasal sprays helpful. He follows with pulm. PFT's were normal. Prior chest imaging has been normal (but he recently had CT chest outside [facility] with report pending). Immunocaps negative for evidence of allergy. He has been unsure whether albuterol helps. Systemic steroids help. He didn't like advair (had difficulty with the powder, didn't find it helpful). Started symbicort in summer 2023, and pulm stepped him up to breztri in 10/2023 after he was hospitalized for a flare of symptoms. He reports improvement, requiring albuterol once per week or less.  -continue breztri 160-9-4.8 mcg 2 puffs BID.  -continue albuterol prn.  -continue levocetirizine nightly.  -continue montelukast nightly.  -if symptoms don't remain well-controlled, consider a biologic.  -if rhinitis worsens, restart flonase 1-2 SEN BID.  -continue to follow with pulmonology.    # Recurrent aphthous ulcers: Unsure of etiology. Using magic mouthwash prn.     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 Has seen rheumatology, low suspicion for IgG4 related disease.      Follow up: 3-4 months     I spent a total of 50 minutes on the day of the visit.  This includes face to face time and non-face to face time preparing to  see the patient (eg, review of tests), obtaining and/or reviewing separately obtained history, documenting clinical information in the electronic or other health record.    Lashay Martinez MD  Allergy/Immunology

## 2024-01-09 ENCOUNTER — TELEPHONE (OUTPATIENT)
Dept: INTERNAL MEDICINE | Facility: CLINIC | Age: 64
End: 2024-01-09
Payer: COMMERCIAL

## 2024-01-09 DIAGNOSIS — R05.3 CHRONIC COUGH: Primary | ICD-10-CM

## 2024-01-09 NOTE — TELEPHONE ENCOUNTER
----- Message from Bonnie Zaldivar sent at 1/8/2024  3:57 PM CST -----  Contact: Carlo simon/[facility] [phone]  Carlo is calling in regards to a refill on pt's albuterol-ipratropium (DUO-NEB) 2.5 mg-0.5 mg/3 mL nebulizer solution 90 mL.          LOV 4/17/23  Lf 6/6/23

## 2024-01-10 RX ORDER — IPRATROPIUM BROMIDE AND ALBUTEROL SULFATE 2.5; .5 MG/3ML; MG/3ML
3 SOLUTION RESPIRATORY (INHALATION) EVERY 4 HOURS PRN
Qty: 90 ML | Refills: 0 | Status: SHIPPED | OUTPATIENT
Start: 2024-01-10 | End: 2024-01-15

## 2024-02-06 DIAGNOSIS — Z12.11 COLON CANCER SCREENING: ICD-10-CM

## 2024-02-14 DIAGNOSIS — I25.119 CORONARY ARTERY DISEASE INVOLVING NATIVE CORONARY ARTERY OF NATIVE HEART WITH ANGINA PECTORIS: ICD-10-CM

## 2024-02-14 DIAGNOSIS — I50.22 CHRONIC SYSTOLIC CONGESTIVE HEART FAILURE: ICD-10-CM

## 2024-02-14 RX ORDER — SACUBITRIL AND VALSARTAN 24; 26 MG/1; MG/1
1 TABLET, FILM COATED ORAL 2 TIMES DAILY
Qty: 60 TABLET | Refills: 6 | Status: CANCELLED | OUTPATIENT
Start: 2024-02-14

## 2024-02-14 NOTE — TELEPHONE ENCOUNTER
----- Message from Pamela Souza MD sent at 2/14/2024  2:49 PM CST -----  The patient Is requesting Entresto that is normally managed by a cardiologist.  Please call and see who her cardiologist is and why they are not sending this prescription to them

## 2024-02-14 NOTE — TELEPHONE ENCOUNTER
I spoke to the patient. He will contact his cardiologist for refill Rx, ENTRESTO.    Also advised to call scheduling to establish care with a new provider.  # sent via message through portal as we discussed.

## 2024-04-09 DIAGNOSIS — R05.9 COUGH: ICD-10-CM

## 2024-04-10 RX ORDER — LEVOCETIRIZINE DIHYDROCHLORIDE 5 MG/1
5 TABLET, FILM COATED ORAL NIGHTLY
Qty: 30 TABLET | Refills: 11 | Status: SHIPPED | OUTPATIENT
Start: 2024-04-10 | End: 2025-04-10

## 2024-05-14 DIAGNOSIS — I50.22 CHRONIC SYSTOLIC CONGESTIVE HEART FAILURE: ICD-10-CM

## 2024-05-14 RX ORDER — DAPAGLIFLOZIN 10 MG/1
10 TABLET, FILM COATED ORAL
Qty: 90 TABLET | Refills: 3 | Status: CANCELLED | OUTPATIENT
Start: 2024-05-14

## 2024-05-15 ENCOUNTER — TELEPHONE (OUTPATIENT)
Dept: URGENT CARE | Facility: CLINIC | Age: 64
End: 2024-05-15
Payer: COMMERCIAL

## 2024-05-15 ENCOUNTER — LAB VISIT (OUTPATIENT)
Dept: LAB | Facility: HOSPITAL | Age: 64
End: 2024-05-15
Payer: COMMERCIAL

## 2024-05-15 ENCOUNTER — OFFICE VISIT (OUTPATIENT)
Dept: INTERNAL MEDICINE | Facility: CLINIC | Age: 64
End: 2024-05-15
Payer: COMMERCIAL

## 2024-05-15 VITALS
RESPIRATION RATE: 14 BRPM | WEIGHT: 149.69 LBS | HEART RATE: 65 BPM | OXYGEN SATURATION: 99 % | BODY MASS INDEX: 24.06 KG/M2 | SYSTOLIC BLOOD PRESSURE: 108 MMHG | TEMPERATURE: 98 F | DIASTOLIC BLOOD PRESSURE: 72 MMHG | HEIGHT: 66 IN

## 2024-05-15 DIAGNOSIS — Z12.12 ENCOUNTER FOR COLORECTAL CANCER SCREENING: ICD-10-CM

## 2024-05-15 DIAGNOSIS — Z57.9 OCCUPATIONAL EXPOSURE IN WORKPLACE: ICD-10-CM

## 2024-05-15 DIAGNOSIS — Z23 NEED FOR VACCINATION: ICD-10-CM

## 2024-05-15 DIAGNOSIS — D64.9 ANEMIA, UNSPECIFIED TYPE: ICD-10-CM

## 2024-05-15 DIAGNOSIS — I50.20 HFREF (HEART FAILURE WITH REDUCED EJECTION FRACTION): ICD-10-CM

## 2024-05-15 DIAGNOSIS — D64.9 ANEMIA, UNSPECIFIED TYPE: Primary | ICD-10-CM

## 2024-05-15 DIAGNOSIS — Z12.11 ENCOUNTER FOR COLORECTAL CANCER SCREENING: ICD-10-CM

## 2024-05-15 DIAGNOSIS — E55.9 VITAMIN D INSUFFICIENCY: ICD-10-CM

## 2024-05-15 PROBLEM — J20.9 BRONCHITIS, ACUTE, WITH BRONCHOSPASM: Status: RESOLVED | Noted: 2023-06-06 | Resolved: 2024-05-15

## 2024-05-15 PROBLEM — J98.01 COUGH DUE TO BRONCHOSPASM: Status: RESOLVED | Noted: 2023-06-06 | Resolved: 2024-05-15

## 2024-05-15 PROBLEM — J47.0 BRONCHIECTASIS WITH ACUTE LOWER RESPIRATORY INFECTION: Status: ACTIVE | Noted: 2024-04-28

## 2024-05-15 PROBLEM — J30.2 SEASONAL ALLERGIC RHINITIS: Status: RESOLVED | Noted: 2023-03-31 | Resolved: 2024-05-15

## 2024-05-15 PROBLEM — D50.9 MICROCYTIC ANEMIA: Status: RESOLVED | Noted: 2023-10-13 | Resolved: 2024-05-15

## 2024-05-15 PROBLEM — J98.09 RECURRENT BRONCHOSPASM: Status: RESOLVED | Noted: 2023-02-07 | Resolved: 2024-05-15

## 2024-05-15 PROBLEM — J32.4 CHRONIC PANSINUSITIS: Status: ACTIVE | Noted: 2024-05-08

## 2024-05-15 LAB
BASOPHILS # BLD AUTO: 0.04 K/UL (ref 0–0.2)
BASOPHILS NFR BLD: 0.7 % (ref 0–1.9)
DIFFERENTIAL METHOD BLD: ABNORMAL
EOSINOPHIL # BLD AUTO: 0.1 K/UL (ref 0–0.5)
EOSINOPHIL NFR BLD: 0.9 % (ref 0–8)
ERYTHROCYTE [DISTWIDTH] IN BLOOD BY AUTOMATED COUNT: 15.8 % (ref 11.5–14.5)
ESTIMATED AVG GLUCOSE: 134 MG/DL (ref 68–131)
HBA1C MFR BLD: 6.3 % (ref 4–5.6)
HCT VFR BLD AUTO: 40.4 % (ref 40–54)
HGB BLD-MCNC: 12.3 G/DL (ref 14–18)
IMM GRANULOCYTES # BLD AUTO: 0.05 K/UL (ref 0–0.04)
IMM GRANULOCYTES NFR BLD AUTO: 0.9 % (ref 0–0.5)
IRON SERPL-MCNC: 40 UG/DL (ref 45–160)
LYMPHOCYTES # BLD AUTO: 1.6 K/UL (ref 1–4.8)
LYMPHOCYTES NFR BLD: 29.6 % (ref 18–48)
MCH RBC QN AUTO: 24.5 PG (ref 27–31)
MCHC RBC AUTO-ENTMCNC: 30.4 G/DL (ref 32–36)
MCV RBC AUTO: 80 FL (ref 82–98)
MONOCYTES # BLD AUTO: 0.5 K/UL (ref 0.3–1)
MONOCYTES NFR BLD: 9.7 % (ref 4–15)
NEUTROPHILS # BLD AUTO: 3.2 K/UL (ref 1.8–7.7)
NEUTROPHILS NFR BLD: 58.2 % (ref 38–73)
NRBC BLD-RTO: 0 /100 WBC
PLATELET # BLD AUTO: 301 K/UL (ref 150–450)
PMV BLD AUTO: 11 FL (ref 9.2–12.9)
RBC # BLD AUTO: 5.03 M/UL (ref 4.6–6.2)
SATURATED IRON: 8 % (ref 20–50)
TOTAL IRON BINDING CAPACITY: 478 UG/DL (ref 250–450)
TRANSFERRIN SERPL-MCNC: 323 MG/DL (ref 200–375)
WBC # BLD AUTO: 5.48 K/UL (ref 3.9–12.7)

## 2024-05-15 PROCEDURE — 85025 COMPLETE CBC W/AUTO DIFF WBC: CPT | Performed by: NURSE PRACTITIONER

## 2024-05-15 PROCEDURE — 90471 IMMUNIZATION ADMIN: CPT | Mod: S$GLB,,, | Performed by: NURSE PRACTITIONER

## 2024-05-15 PROCEDURE — 4010F ACE/ARB THERAPY RXD/TAKEN: CPT | Mod: CPTII,S$GLB,, | Performed by: NURSE PRACTITIONER

## 2024-05-15 PROCEDURE — 3078F DIAST BP <80 MM HG: CPT | Mod: CPTII,S$GLB,, | Performed by: NURSE PRACTITIONER

## 2024-05-15 PROCEDURE — 1159F MED LIST DOCD IN RCRD: CPT | Mod: CPTII,S$GLB,, | Performed by: NURSE PRACTITIONER

## 2024-05-15 PROCEDURE — 3074F SYST BP LT 130 MM HG: CPT | Mod: CPTII,S$GLB,, | Performed by: NURSE PRACTITIONER

## 2024-05-15 PROCEDURE — 83540 ASSAY OF IRON: CPT | Performed by: NURSE PRACTITIONER

## 2024-05-15 PROCEDURE — 90677 PCV20 VACCINE IM: CPT | Mod: S$GLB,,, | Performed by: NURSE PRACTITIONER

## 2024-05-15 PROCEDURE — 3008F BODY MASS INDEX DOCD: CPT | Mod: CPTII,S$GLB,, | Performed by: NURSE PRACTITIONER

## 2024-05-15 PROCEDURE — 1160F RVW MEDS BY RX/DR IN RCRD: CPT | Mod: CPTII,S$GLB,, | Performed by: NURSE PRACTITIONER

## 2024-05-15 PROCEDURE — 99999 PR PBB SHADOW E&M-EST. PATIENT-LVL V: CPT | Mod: PBBFAC,,, | Performed by: NURSE PRACTITIONER

## 2024-05-15 PROCEDURE — 36415 COLL VENOUS BLD VENIPUNCTURE: CPT | Mod: PO | Performed by: NURSE PRACTITIONER

## 2024-05-15 PROCEDURE — 83036 HEMOGLOBIN GLYCOSYLATED A1C: CPT | Performed by: NURSE PRACTITIONER

## 2024-05-15 PROCEDURE — 99214 OFFICE O/P EST MOD 30 MIN: CPT | Mod: 25,S$GLB,, | Performed by: NURSE PRACTITIONER

## 2024-05-15 RX ORDER — NYSTATIN 100000 [USP'U]/ML
SUSPENSION ORAL
COMMUNITY
Start: 2023-12-05

## 2024-05-15 RX ORDER — ERGOCALCIFEROL 1.25 MG/1
50000 CAPSULE ORAL
Qty: 8 CAPSULE | Refills: 0 | Status: SHIPPED | OUTPATIENT
Start: 2024-05-15

## 2024-05-15 RX ORDER — DILTIAZEM HYDROCHLORIDE 30 MG/1
1 TABLET, FILM COATED ORAL 2 TIMES DAILY
COMMUNITY
Start: 2024-02-15

## 2024-05-15 RX ORDER — SULFAMETHOXAZOLE AND TRIMETHOPRIM 800; 160 MG/1; MG/1
1 TABLET ORAL 2 TIMES DAILY
COMMUNITY
Start: 2024-05-06 | End: 2024-05-16

## 2024-05-15 RX ORDER — TIOTROPIUM BROMIDE INHALATION SPRAY 3.12 UG/1
SPRAY, METERED RESPIRATORY (INHALATION)
COMMUNITY
Start: 2024-01-27

## 2024-05-15 RX ORDER — DAPAGLIFLOZIN 10 MG/1
10 TABLET, FILM COATED ORAL DAILY
Qty: 90 TABLET | Refills: 3 | Status: SHIPPED | OUTPATIENT
Start: 2024-05-15

## 2024-05-15 RX ORDER — NAPROXEN SODIUM 220 MG/1
81 TABLET, FILM COATED ORAL DAILY
Qty: 90 TABLET | Refills: 3 | Status: SHIPPED | OUTPATIENT
Start: 2024-05-15

## 2024-05-15 RX ORDER — ALBUTEROL SULFATE 0.83 MG/ML
2.5 SOLUTION RESPIRATORY (INHALATION) EVERY 4 HOURS PRN
COMMUNITY
Start: 2024-04-06

## 2024-05-15 SDOH — SOCIAL DETERMINANTS OF HEALTH (SDOH): OCCUPATIONAL EXPOSURE TO UNSPECIFIED RISK FACTOR: Z57.9

## 2024-05-15 NOTE — TELEPHONE ENCOUNTER
Called the patient in reference to the referral placed to Occupational Health and received the patients voicemail box, I left a message informing the patient of the reason for the call. AFG

## 2024-05-15 NOTE — PROGRESS NOTES
Subjective:       Patient ID: Gordon Dewitt is a 64 y.o. male.    Chief Complaint: Follow-up (Discuss results)    Patient is a 64 y.o. male who traditionally follows with No primary care provider on file. presenting today for follow up and medication refill.     Patient is currently undergoing evaluation at [facility] for chronic sinusitis, occlusion of fronto ethmoidal recesses and possible fungal infection. He is set to have surgery at the end of May. 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     Has labs from outside provider indicating anemia - B12 testing was normal. Suggested patient undergo colonoscopy/EGD. Referral to to be placed.     Review of patient's allergies indicates:  No Known Allergies    Medication List with Changes/Refills   New Medications    ERGOCALCIFEROL (ERGOCALCIFEROL) 50,000 UNIT CAP    Take 1 capsule (50,000 Units total) by mouth every 7 days.   Current Medications    ALBUTEROL (PROVENTIL) 2.5 MG /3 ML (0.083 %) NEBULIZER SOLUTION    Take 2.5 mg by nebulization every 4 (four) hours as needed.    ALBUTEROL (PROVENTIL/VENTOLIN HFA) 90 MCG/ACTUATION INHALER    2 puffs every 4 (four) hours as needed.    ALBUTEROL-IPRATROPIUM (DUO-NEB) 2.5 MG-0.5 MG/3 ML NEBULIZER SOLUTION    Take 3 mLs by nebulization every 4 (four) hours as needed for Wheezing. Rescue    ATORVASTATIN (LIPITOR) 40 MG TABLET    Take 1 tablet (40 mg total) by mouth every evening.    AZELASTINE (ASTELIN) 137 MCG (0.1 %) NASAL SPRAY    1 spray (137 mcg total) by Nasal route 2 (two) times daily as needed for Rhinitis.    BENZOCAINE/MENTHOL/ZINC CHLOR (ORAJEL 3X MOUTH SORES MM)    by Mucous Membrane route. Apply topically to mouth blisters daily.    BRILINTA 90 MG TABLET    Take 1 tablet (90 mg total) by mouth 2 (two) times daily.    BUDESONIDE-FORMOTEROL 160-4.5 MCG (SYMBICORT) 160-4.5 MCG/ACTUATION HFAA    Inhale 2 puffs into the lungs every 12 (twelve) hours. Controller     BUDESONIDE-GLYCOPYR-FORMOTEROL (BREZTRI AEROSPHERE) 160-9-4.8 MCG/ACTUATION HFAA    Inhale 2 puffs into the lungs 2 (two) times daily.    DILTIAZEM (CARDIZEM) 30 MG TABLET    Take 1 tablet by mouth 2 (two) times daily.    ENTRESTO 24-26 MG PER TABLET    Take 1 tablet by mouth 2 (two) times daily.    FLUTICASONE PROPIONATE (FLONASE) 50 MCG/ACTUATION NASAL SPRAY    1 spray (50 mcg total) by Each Nostril route daily as needed for Rhinitis.    FLUTICASONE-SALMETEROL DISKUS INHALER 250-50 MCG    Inhale 1 puff into the lungs 2 (two) times daily. Controller    IBUPROFEN (ADVIL,MOTRIN) 200 MG TABLET    Take 400 mg by mouth daily as needed for Pain.    LEVOCETIRIZINE (XYZAL) 5 MG TABLET    Take 1 tablet (5 mg total) by mouth every evening.    METOPROLOL SUCCINATE (TOPROL-XL) 25 MG 24 HR TABLET    Take 1 tablet (25 mg total) by mouth once daily. HOLD THIS MEDICATION UNTIL YOU COMPLETE STEROIDS.  IT CAN CONTRIBUTE TO BRONCHOSPASMS    MONTELUKAST (SINGULAIR) 10 MG TABLET    Take 10 mg by mouth once daily.    NITROGLYCERIN (NITROSTAT) 0.4 MG SL TABLET    Place 0.4 mg under the tongue.    NYSTATIN (MYCOSTATIN) 100,000 UNIT/ML SUSPENSION    SWISH AND swallow 5ML BY MOUTH FOUR TIMES DAILY FOR TWO WEEKS    OMEGA-3 FATTY ACIDS/FISH OIL (FISH OIL-OMEGA-3 FATTY ACIDS) 300-1,000 MG CAPSULE    Take 1 capsule by mouth once a week.    SPIRIVA RESPIMAT 2.5 MCG/ACTUATION INHALER    inhale TWO puffs by MOUTH EVERY DAY    SULFAMETHOXAZOLE-TRIMETHOPRIM 800-160MG (BACTRIM DS) 800-160 MG TAB    Take 1 tablet by mouth 2 (two) times daily.   Changed and/or Refilled Medications    Modified Medication Previous Medication    ASPIRIN 81 MG CHEW aspirin 81 MG Chew       Take 1 tablet (81 mg total) by mouth once daily.    Take 81 mg by mouth once daily.    DAPAGLIFLOZIN PROPANEDIOL (FARXIGA) 10 MG TABLET dapagliflozin (FARXIGA) 10 mg tablet       Take 1 tablet (10 mg total) by mouth Daily.    Take 1 tablet (10 mg total) by mouth once daily at 6am.  "    Medical, social and surgical history has been reviewed with the patient.     Review of Systems   HENT:  Positive for congestion.    Respiratory:  Positive for cough and shortness of breath.       Objective:   /72 (BP Location: Right arm, Patient Position: Sitting, BP Method: Medium (Manual))   Pulse 65   Temp 97.9 °F (36.6 °C) (Temporal)   Resp 14   Ht 5' 6" (1.676 m)   Wt 67.9 kg (149 lb 11.1 oz)   SpO2 99%   BMI 24.16 kg/m²       Physical Exam  Vitals reviewed.   Constitutional:       Appearance: Normal appearance.   HENT:      Head: Normocephalic and atraumatic.   Cardiovascular:      Rate and Rhythm: Normal rate and regular rhythm.      Heart sounds: Normal heart sounds. No murmur heard.  Pulmonary:      Effort: Pulmonary effort is normal.      Breath sounds: Normal breath sounds. No wheezing.   Skin:     General: Skin is warm and dry.   Neurological:      Mental Status: He is alert and oriented to person, place, and time.         I reviewed and independently interpreted the labs and imaging below:  Last Labs:  Glucose   Date Value Ref Range Status   10/13/2023 129 (H) 70 - 110 mg/dL Final   10/12/2023 136 (H) 70 - 110 mg/dL Final     BUN   Date Value Ref Range Status   10/13/2023 15 8 - 23 mg/dL Final   10/12/2023 15 8 - 23 mg/dL Final     Creatinine   Date Value Ref Range Status   10/13/2023 0.9 0.5 - 1.4 mg/dL Final   10/12/2023 1.0 0.5 - 1.4 mg/dL Final     Cholesterol   Date Value Ref Range Status   01/18/2023 115 (L) 120 - 199 mg/dL Final     Comment:     The National Cholesterol Education Program (NCEP) has set the  following guidelines (reference ranges) for Cholesterol:  Optimal.....................<200 mg/dL  Borderline High.............200-239 mg/dL  High........................> or = 240 mg/dL       Hemoglobin A1C   Date Value Ref Range Status   01/18/2023 6.1 (H) 4.0 - 5.6 % Final     Comment:     ADA Screening Guidelines:  5.7-6.4%  Consistent with prediabetes  >or=6.5%  Consistent " with diabetes    High levels of fetal hemoglobin interfere with the HbA1C  assay. Heterozygous hemoglobin variants (HbS, HgC, etc)do  not significantly interfere with this assay.   However, presence of multiple variants may affect accuracy.     05/04/2022 6.1 (H) 4.5 - 5.7 % Final     Hemoglobin   Date Value Ref Range Status   10/13/2023 11.9 (L) 14.0 - 18.0 g/dL Final   10/12/2023 12.8 (L) 14.0 - 18.0 g/dL Final     Hematocrit   Date Value Ref Range Status   10/13/2023 40.6 40.0 - 54.0 % Final   10/12/2023 42.5 40.0 - 54.0 % Final       Assessment and Plan:     1. Anemia, unspecified type  - Iron and TIBC; Future  - CBC Auto Differential; Future  - Ambulatory referral/consult to Endo Procedure ; Future    2. Vitamin D insufficiency  - ergocalciferol (ERGOCALCIFEROL) 50,000 unit Cap; Take 1 capsule (50,000 Units total) by mouth every 7 days.  Dispense: 8 capsule; Refill: 0    3. Occupational exposure in workplace  - Ambulatory referral/consult to Occupational Medicine; Future    4. HFrEF (heart failure with reduced ejection fraction)  - Hemoglobin A1C; Future  - dapagliflozin propanediol (FARXIGA) 10 mg tablet; Take 1 tablet (10 mg total) by mouth Daily.  Dispense: 90 tablet; Refill: 3  - aspirin 81 MG Chew; Take 1 tablet (81 mg total) by mouth once daily.  Dispense: 90 tablet; Refill: 3    5. Need for vaccination  - (VFC) pneumococcocal 20 vaccine (PREVNAR 20) syringe (preferred for >/= 2 months)    6. Encounter for colorectal cancer screening  - Ambulatory referral/consult to Endo Procedure ; Future

## 2024-06-04 ENCOUNTER — PATIENT MESSAGE (OUTPATIENT)
Dept: INTERNAL MEDICINE | Facility: CLINIC | Age: 64
End: 2024-06-04
Payer: COMMERCIAL

## 2024-06-05 DIAGNOSIS — Z12.5 ENCOUNTER FOR PROSTATE CANCER SCREENING: Primary | ICD-10-CM

## 2024-10-11 ENCOUNTER — CLINICAL SUPPORT (OUTPATIENT)
Dept: ENDOSCOPY | Facility: HOSPITAL | Age: 64
End: 2024-10-11
Payer: COMMERCIAL

## 2024-10-11 ENCOUNTER — TELEPHONE (OUTPATIENT)
Dept: ENDOSCOPY | Facility: HOSPITAL | Age: 64
End: 2024-10-11

## 2024-10-11 DIAGNOSIS — D64.9 ANEMIA, UNSPECIFIED TYPE: ICD-10-CM

## 2024-10-11 DIAGNOSIS — Z12.11 ENCOUNTER FOR COLORECTAL CANCER SCREENING: ICD-10-CM

## 2024-10-11 DIAGNOSIS — Z12.12 ENCOUNTER FOR COLORECTAL CANCER SCREENING: ICD-10-CM

## 2024-10-11 NOTE — TELEPHONE ENCOUNTER
Contacted patient to schedule EGD/Colonoscopy. Patient declined scheduling procedures at this time. Offered to schedule new PAT appt, patient states he will call us back when he is ready to get scheduled. # [phone] given to call. Understanding verbalized.

## 2024-10-11 NOTE — PLAN OF CARE
Contacted patient to schedule EGD/Colonoscopy. Patient declined scheduling procedures at this time. Offered to schedule new PAT appt, patient states he will call us back when he is ready to get scheduled. # [phone] given to call. Understanding verbalized.